# Patient Record
Sex: FEMALE | Race: WHITE | Employment: UNEMPLOYED | URBAN - METROPOLITAN AREA
[De-identification: names, ages, dates, MRNs, and addresses within clinical notes are randomized per-mention and may not be internally consistent; named-entity substitution may affect disease eponyms.]

---

## 2017-11-02 ENCOUNTER — OFFICE VISIT (OUTPATIENT)
Dept: INTERNAL MEDICINE CLINIC | Age: 47
End: 2017-11-02

## 2017-11-02 VITALS
HEART RATE: 80 BPM | BODY MASS INDEX: 25.74 KG/M2 | SYSTOLIC BLOOD PRESSURE: 100 MMHG | DIASTOLIC BLOOD PRESSURE: 70 MMHG | WEIGHT: 164 LBS | OXYGEN SATURATION: 98 % | HEIGHT: 67 IN

## 2017-11-02 DIAGNOSIS — E53.8 VITAMIN B12 DEFICIENCY: ICD-10-CM

## 2017-11-02 DIAGNOSIS — Z13.220 SCREENING, LIPID: ICD-10-CM

## 2017-11-02 DIAGNOSIS — Z12.31 ENCOUNTER FOR SCREENING MAMMOGRAM FOR MALIGNANT NEOPLASM OF BREAST: ICD-10-CM

## 2017-11-02 DIAGNOSIS — E61.1 IRON DEFICIENCY: Primary | ICD-10-CM

## 2017-11-02 DIAGNOSIS — Z13.1 SCREENING FOR DIABETES MELLITUS: ICD-10-CM

## 2017-11-02 DIAGNOSIS — Z98.84 HISTORY OF GASTRIC BYPASS: ICD-10-CM

## 2017-11-02 DIAGNOSIS — T81.30XA DISRUPTION OF PERINEAL WOUND: ICD-10-CM

## 2017-11-02 PROCEDURE — 4004F PT TOBACCO SCREEN RCVD TLK: CPT | Performed by: INTERNAL MEDICINE

## 2017-11-02 PROCEDURE — G8427 DOCREV CUR MEDS BY ELIG CLIN: HCPCS | Performed by: INTERNAL MEDICINE

## 2017-11-02 PROCEDURE — G8419 CALC BMI OUT NRM PARAM NOF/U: HCPCS | Performed by: INTERNAL MEDICINE

## 2017-11-02 PROCEDURE — 99204 OFFICE O/P NEW MOD 45 MIN: CPT | Performed by: INTERNAL MEDICINE

## 2017-11-02 PROCEDURE — G8482 FLU IMMUNIZE ORDER/ADMIN: HCPCS | Performed by: INTERNAL MEDICINE

## 2017-11-02 RX ORDER — LORAZEPAM 2 MG/1
TABLET ORAL
COMMUNITY
Start: 2016-11-19 | End: 2018-09-19 | Stop reason: SDUPTHER

## 2017-11-02 RX ORDER — FUROSEMIDE 40 MG/1
TABLET ORAL
COMMUNITY
Start: 2017-09-07 | End: 2019-04-29 | Stop reason: SDUPTHER

## 2017-11-02 RX ORDER — QUETIAPINE FUMARATE 400 MG/1
800 TABLET, FILM COATED ORAL
COMMUNITY
End: 2018-09-19 | Stop reason: SDUPTHER

## 2017-11-02 NOTE — PROGRESS NOTES
Chief Complaint   Patient presents with    Established New Doctor          History of Present Illness:  Rahel Guerrero is a 41 y.o. female here to establish care. Patient has a history of ulcerative colitis and is s/p colectomy in 2006, obesity s/p Dolly-en-y procedure in 2001 (max weight 350), small intestine surgery in 2003, transanal rectal resection in 2008, depression, anxiety, post- op DVT, rosacea. She is worried about malabsorption because of her many GI surgeries. She has a draining tract in her perineum that will not heal so she is worried some type of vitamin deficiency is contributing. She has an ileostomy. Output is stable. Her weight is up and she is actually trying to lose weight. [email or web address]. She has not seen a dietitian     Her mood is stable. She sees a psychiatrist and is sleeping much better. Patient's medical record reviewed: In May 2017 she was admitted to observation in DKA and RUFUS. Glucose improved with fluids only, no insulin was needed. A1C was normal at 5.4%. She was anemic at that time and had low iron. There does not appear to be PCP follow up for this. She has Vitamin B12 deficiency         History:     Past Medical History:   Diagnosis Date    Depression     [facility]) ([facility] [address]     post operative, left thigh    Ileostomy in place ([facility] [address] [facility]     Ulcerative colitis ([facility] [address] [facility]     Vitamin B12 deficiency        Past Surgical History:   Procedure Laterality Date    BREAST ENHANCEMENT SURGERY  2003    COLECTOMY  2006    COSMETIC SURGERY      medial thigh lift    RECTAL SURGERY  2008    Transanal rectal resection    DOLLY-EN-Y GASTRIC BYPASS  2001       Social History     Social History    Marital status: N/A     Spouse name: N/A    Number of children: N/A    Years of education: N/A     Occupational History    Not on file.      Social History Main Topics    Smoking status: Light Tobacco Smoker     Types: E-Cigarettes     Last attempt to quit: 1/1/2010    Smokeless tobacco: Never Used    Alcohol use 4.2 oz/week     7 Glasses of wine per week    Drug use: No    Sexual activity: Not on file     Other Topics Concern    Not on file     Social History Narrative    No narrative on file       Family History   Problem Relation Age of Onset    Coronary Art Dis Father     Cancer Father      Throat    COPD Father     Arthritis Father     Diabetes Mother     Arthritis Mother     Lupus Mother          Review of Systems:  Review of Systems   Constitutional: Negative for fatigue and fever. HENT: Negative for ear pain, hearing loss, postnasal drip, rhinorrhea, sinus pressure, sore throat and tinnitus. Eyes: Negative for redness. Respiratory: Negative for cough, chest tightness, shortness of breath and wheezing. Cardiovascular: Negative for chest pain, palpitations and leg swelling. Gastrointestinal: Negative for abdominal pain, constipation, diarrhea, nausea and vomiting. Genitourinary: Negative for dysuria and frequency. Musculoskeletal: Negative for arthralgias, back pain and joint swelling. Skin: Positive for wound. Negative for rash. Neurological: Negative for dizziness, syncope and headaches. Objective:    Vitals:    11/02/17 1506   BP: 100/70   Pulse: 80   SpO2: 98%   Weight: 164 lb (74.4 kg)   Height: 5' 6.6\" (1.692 m)     Body mass index is 26 kg/m². Physical Exam   Constitutional: She appears well-developed and well-nourished. She does not have a sickly appearance. HENT:   Head: Atraumatic. Right Ear: Hearing, tympanic membrane, external ear and ear canal normal.   Left Ear: Hearing, tympanic membrane, external ear and ear canal normal.   Nose: Nose normal. No mucosal edema or rhinorrhea. Eyes: Pupils are equal, round, and reactive to light. No scleral icterus. Neck: Trachea normal. No thyroid mass and no thyromegaly present.    Cardiovascular: Normal rate, regular rhythm, S1 normal and S2 normal.    No murmur heard.  Pulmonary/Chest: Effort normal and breath sounds normal. No respiratory distress. She has no wheezes. She has no rhonchi. She has no rales. Abdominal: Soft. Bowel sounds are normal. There is no tenderness. Wearing girdle, unable to visualize ostomy    Lymphadenopathy:     She has no cervical adenopathy. Neurological: She is alert. No cranial nerve deficit. She exhibits normal muscle tone. Gait normal.   Skin: Skin is warm and dry. No rash noted. Assessment and Plan    1. Iron deficiency  Check iron levels and CBC   - CBC Auto Differential; Future  - Iron and TIBC; Future    2. Vitamin B12 deficiency  Check B12 level  - CBC Auto Differential; Future  - Vitamin B12 & Folate; Future  - Comprehensive Metabolic Panel; Future    3. Disruption of perineal wound  Check zinc   - ZINC; Future    4. History of gastric bypass  Offered info for [employer] for dietitian. Her nutritional needs are unique given her history of bariatric surgery, ileostomy, and now possible diabetes. - Vitamin D 25 Hydroxy; Future    5. Screening for diabetes mellitus  Patient admitted for DKA in May but no follow up for this. Check A1C.   - Hemoglobin A1C; Future    6. Encounter for screening mammogram for malignant neoplasm of breast    - DINA DIGITAL SCREEN W CAD BILATERAL; Future    7. Screening, lipid    - Lipid Panel; Future     Patient Instructions   Iron deficiency  Check CBC and iron levels    Vitamin B12 deficiency  Check CBC and B12 levels    Poor wound healing  Check sink level     History of gastric bypass  Check vitamin D    Routine screening  Check for diabetes  Check mammogram          Return in about 3 months (around 2/2/2018) for Vitmamin Deficiency .       Angelo Vigil

## 2017-11-07 ASSESSMENT — ENCOUNTER SYMPTOMS
BACK PAIN: 0
SORE THROAT: 0
RHINORRHEA: 0
COUGH: 0
SHORTNESS OF BREATH: 0
CHEST TIGHTNESS: 0
DIARRHEA: 0
ABDOMINAL PAIN: 0
CONSTIPATION: 0
NAUSEA: 0
VOMITING: 0
EYE REDNESS: 0
SINUS PRESSURE: 0
WHEEZING: 0

## 2017-11-28 ENCOUNTER — HOSPITAL ENCOUNTER (OUTPATIENT)
Dept: MAMMOGRAPHY | Age: 47
Discharge: HOME | End: 2017-11-28
Attending: INTERNAL MEDICINE | Admitting: INTERNAL MEDICINE

## 2017-11-28 DIAGNOSIS — Z12.31 VISIT FOR SCREENING MAMMOGRAM: ICD-10-CM

## 2017-11-28 DIAGNOSIS — Z13.220 SCREENING, LIPID: ICD-10-CM

## 2017-11-28 DIAGNOSIS — T81.30XA DISRUPTION OF PERINEAL WOUND: ICD-10-CM

## 2017-11-28 DIAGNOSIS — E61.1 IRON DEFICIENCY: ICD-10-CM

## 2017-11-28 DIAGNOSIS — Z98.84 HISTORY OF GASTRIC BYPASS: ICD-10-CM

## 2017-11-28 DIAGNOSIS — Z13.1 SCREENING FOR DIABETES MELLITUS: ICD-10-CM

## 2017-11-28 DIAGNOSIS — E53.8 VITAMIN B12 DEFICIENCY: ICD-10-CM

## 2017-11-28 LAB
A/G RATIO: 1.9 (ref 1.1–2.2)
ALBUMIN SERPL-MCNC: 4.7 G/DL (ref 3.4–5)
ALP BLD-CCNC: 71 U/L (ref 40–129)
ALT SERPL-CCNC: 12 U/L (ref 10–40)
ANION GAP SERPL CALCULATED.3IONS-SCNC: 15 MMOL/L (ref 3–16)
AST SERPL-CCNC: 17 U/L (ref 15–37)
BASOPHILS ABSOLUTE: 0.1 K/UL (ref 0–0.2)
BASOPHILS RELATIVE PERCENT: 1.2 %
BILIRUB SERPL-MCNC: <0.2 MG/DL (ref 0–1)
BUN BLDV-MCNC: 18 MG/DL (ref 7–20)
CALCIUM SERPL-MCNC: 9.7 MG/DL (ref 8.3–10.6)
CHLORIDE BLD-SCNC: 101 MMOL/L (ref 99–110)
CHOLESTEROL, TOTAL: 212 MG/DL (ref 0–199)
CO2: 26 MMOL/L (ref 21–32)
CREAT SERPL-MCNC: 0.7 MG/DL (ref 0.6–1.1)
EOSINOPHILS ABSOLUTE: 0.2 K/UL (ref 0–0.6)
EOSINOPHILS RELATIVE PERCENT: 2.6 %
ESTIMATED AVERAGE GLUCOSE: 114 MG/DL
FOLATE: 11.17 NG/ML (ref 4.78–24.2)
GFR AFRICAN AMERICAN: >60
GFR NON-AFRICAN AMERICAN: >60
GLOBULIN: 2.5 G/DL
GLUCOSE BLD-MCNC: 97 MG/DL (ref 70–99)
HBA1C MFR BLD: 5.6 %
HCT VFR BLD CALC: 40.6 % (ref 36–48)
HDLC SERPL-MCNC: 103 MG/DL (ref 40–60)
HEMOGLOBIN: 13.1 G/DL (ref 12–16)
IRON SATURATION: 9 % (ref 15–50)
IRON: 43 UG/DL (ref 37–145)
LDL CHOLESTEROL CALCULATED: 94 MG/DL
LYMPHOCYTES ABSOLUTE: 1.4 K/UL (ref 1–5.1)
LYMPHOCYTES RELATIVE PERCENT: 20.3 %
MCH RBC QN AUTO: 26.9 PG (ref 26–34)
MCHC RBC AUTO-ENTMCNC: 32.3 G/DL (ref 31–36)
MCV RBC AUTO: 83.2 FL (ref 80–100)
MONOCYTES ABSOLUTE: 0.6 K/UL (ref 0–1.3)
MONOCYTES RELATIVE PERCENT: 8.9 %
NEUTROPHILS ABSOLUTE: 4.5 K/UL (ref 1.7–7.7)
NEUTROPHILS RELATIVE PERCENT: 67 %
PDW BLD-RTO: 14.5 % (ref 12.4–15.4)
PLATELET # BLD: 203 K/UL (ref 135–450)
PMV BLD AUTO: 11.1 FL (ref 5–10.5)
POTASSIUM SERPL-SCNC: 4.7 MMOL/L (ref 3.5–5.1)
RBC # BLD: 4.88 M/UL (ref 4–5.2)
SODIUM BLD-SCNC: 142 MMOL/L (ref 136–145)
TOTAL IRON BINDING CAPACITY: 478 UG/DL (ref 260–445)
TOTAL PROTEIN: 7.2 G/DL (ref 6.4–8.2)
TRIGL SERPL-MCNC: 74 MG/DL (ref 0–150)
VITAMIN B-12: 338 PG/ML (ref 211–911)
VITAMIN D 25-HYDROXY: 24.9 NG/ML
VLDLC SERPL CALC-MCNC: 15 MG/DL
WBC # BLD: 6.7 K/UL (ref 4–11)

## 2017-11-29 ENCOUNTER — TELEPHONE (OUTPATIENT)
Dept: INTERNAL MEDICINE CLINIC | Age: 47
End: 2017-11-29

## 2017-11-29 NOTE — TELEPHONE ENCOUNTER
Patient would like Dr. Samuel Gracia to call her ASAP (today) with lab results. She can only see certain results on My Chart. She has several questions.

## 2017-11-30 LAB — ZINC: 69 UG/DL (ref 60–120)

## 2018-04-03 ENCOUNTER — OFFICE VISIT (OUTPATIENT)
Dept: INTERNAL MEDICINE CLINIC | Age: 48
End: 2018-04-03

## 2018-04-03 VITALS
OXYGEN SATURATION: 98 % | SYSTOLIC BLOOD PRESSURE: 102 MMHG | BODY MASS INDEX: 26.07 KG/M2 | WEIGHT: 172 LBS | HEIGHT: 68 IN | DIASTOLIC BLOOD PRESSURE: 70 MMHG | HEART RATE: 78 BPM

## 2018-04-03 DIAGNOSIS — F41.1 ANXIETY STATE: ICD-10-CM

## 2018-04-03 DIAGNOSIS — R63.5 ABNORMAL WEIGHT GAIN: Primary | ICD-10-CM

## 2018-04-03 PROCEDURE — G8419 CALC BMI OUT NRM PARAM NOF/U: HCPCS | Performed by: INTERNAL MEDICINE

## 2018-04-03 PROCEDURE — 4004F PT TOBACCO SCREEN RCVD TLK: CPT | Performed by: INTERNAL MEDICINE

## 2018-04-03 PROCEDURE — G8427 DOCREV CUR MEDS BY ELIG CLIN: HCPCS | Performed by: INTERNAL MEDICINE

## 2018-04-03 PROCEDURE — 99213 OFFICE O/P EST LOW 20 MIN: CPT | Performed by: INTERNAL MEDICINE

## 2018-04-06 ASSESSMENT — ENCOUNTER SYMPTOMS
COUGH: 0
CHEST TIGHTNESS: 0
SHORTNESS OF BREATH: 0
EYE REDNESS: 0
BACK PAIN: 0
NAUSEA: 0
RHINORRHEA: 0
DIARRHEA: 0
VISUAL CHANGE: 0
VOMITING: 0
SINUS PRESSURE: 0
ABDOMINAL PAIN: 0
WHEEZING: 0
CONSTIPATION: 0
SORE THROAT: 0

## 2018-05-07 ENCOUNTER — TELEPHONE (OUTPATIENT)
Dept: INTERNAL MEDICINE CLINIC | Age: 48
End: 2018-05-07

## 2018-05-07 DIAGNOSIS — N30.00 ACUTE CYSTITIS WITHOUT HEMATURIA: Primary | ICD-10-CM

## 2018-05-07 RX ORDER — NITROFURANTOIN 25; 75 MG/1; MG/1
100 CAPSULE ORAL 2 TIMES DAILY
Qty: 14 CAPSULE | Refills: 0 | Status: SHIPPED | OUTPATIENT
Start: 2018-05-07 | End: 2018-05-14

## 2018-05-27 ENCOUNTER — TELEPHONE (OUTPATIENT)
Dept: INTERNAL MEDICINE CLINIC | Age: 48
End: 2018-05-27

## 2018-05-27 ENCOUNTER — E-VISIT (OUTPATIENT)
Dept: INTERNAL MEDICINE CLINIC | Age: 48
End: 2018-05-27

## 2018-05-27 DIAGNOSIS — N39.0 URINARY TRACT INFECTION WITHOUT HEMATURIA, SITE UNSPECIFIED: Primary | ICD-10-CM

## 2018-05-27 PROCEDURE — 99444 PR PHYSICIAN ONLINE EVALUATION & MANAGEMENT SERVICE: CPT | Performed by: FAMILY MEDICINE

## 2018-05-27 RX ORDER — DOXYCYCLINE 100 MG/1
100 CAPSULE ORAL 2 TIMES DAILY
Qty: 14 CAPSULE | Refills: 0 | Status: SHIPPED | OUTPATIENT
Start: 2018-05-27 | End: 2018-07-09 | Stop reason: ALTCHOICE

## 2018-07-09 ENCOUNTER — OFFICE VISIT (OUTPATIENT)
Dept: INTERNAL MEDICINE CLINIC | Age: 48
End: 2018-07-09

## 2018-07-09 VITALS
DIASTOLIC BLOOD PRESSURE: 62 MMHG | HEART RATE: 104 BPM | SYSTOLIC BLOOD PRESSURE: 98 MMHG | OXYGEN SATURATION: 99 % | BODY MASS INDEX: 23.87 KG/M2 | WEIGHT: 157 LBS

## 2018-07-09 DIAGNOSIS — N39.0 RECURRENT UTI: ICD-10-CM

## 2018-07-09 DIAGNOSIS — H00.014 HORDEOLUM EXTERNUM OF LEFT UPPER EYELID: ICD-10-CM

## 2018-07-09 DIAGNOSIS — R63.5 ABNORMAL WEIGHT GAIN: Primary | ICD-10-CM

## 2018-07-09 DIAGNOSIS — N92.6 IRREGULAR MENSES: ICD-10-CM

## 2018-07-09 PROCEDURE — G8427 DOCREV CUR MEDS BY ELIG CLIN: HCPCS | Performed by: INTERNAL MEDICINE

## 2018-07-09 PROCEDURE — 99214 OFFICE O/P EST MOD 30 MIN: CPT | Performed by: INTERNAL MEDICINE

## 2018-07-09 PROCEDURE — 4004F PT TOBACCO SCREEN RCVD TLK: CPT | Performed by: INTERNAL MEDICINE

## 2018-07-09 PROCEDURE — G8420 CALC BMI NORM PARAMETERS: HCPCS | Performed by: INTERNAL MEDICINE

## 2018-07-09 RX ORDER — NITROFURANTOIN 25; 75 MG/1; MG/1
100 CAPSULE ORAL 2 TIMES DAILY
Qty: 10 CAPSULE | Refills: 2 | Status: SHIPPED | OUTPATIENT
Start: 2018-07-09 | End: 2018-07-14

## 2018-07-09 NOTE — PATIENT INSTRUCTIONS
Try Premier Protein Shake- caramel flavor- for creamer     Stye  Swelling will go down with time  If you develop a hard nodule, you may need to see an eye doctor    Recurrent UTI  Take Macrobid as needed     Healthy Weight  Continue Contrave    Irregular Period  Monitor for now     Insomnia  Let me know when you need a refill three days in advance

## 2018-07-10 ASSESSMENT — ENCOUNTER SYMPTOMS
RESPIRATORY NEGATIVE: 1
GASTROINTESTINAL NEGATIVE: 1
ALLERGIC/IMMUNOLOGIC NEGATIVE: 1

## 2018-08-23 DIAGNOSIS — R63.5 ABNORMAL WEIGHT GAIN: ICD-10-CM

## 2018-08-23 RX ORDER — NALTREXONE HYDROCHLORIDE AND BUPROPION HYDROCHLORIDE 8; 90 MG/1; MG/1
TABLET, EXTENDED RELEASE ORAL
Qty: 120 TABLET | Refills: 3 | Status: SHIPPED | OUTPATIENT
Start: 2018-08-23 | End: 2018-11-09 | Stop reason: SDUPTHER

## 2018-09-19 ENCOUNTER — PATIENT MESSAGE (OUTPATIENT)
Dept: INTERNAL MEDICINE CLINIC | Age: 48
End: 2018-09-19

## 2018-09-19 DIAGNOSIS — F41.1 ANXIETY STATE: Primary | ICD-10-CM

## 2018-09-20 RX ORDER — QUETIAPINE FUMARATE 400 MG/1
800 TABLET, FILM COATED ORAL NIGHTLY
Qty: 60 TABLET | Refills: 2 | Status: SHIPPED | OUTPATIENT
Start: 2018-09-20 | End: 2018-11-09 | Stop reason: SDUPTHER

## 2018-09-20 RX ORDER — LORAZEPAM 2 MG/1
TABLET ORAL
Qty: 60 TABLET | Refills: 2 | Status: SHIPPED | OUTPATIENT
Start: 2018-09-20 | End: 2018-11-09 | Stop reason: SDUPTHER

## 2018-11-09 ENCOUNTER — OFFICE VISIT (OUTPATIENT)
Dept: INTERNAL MEDICINE CLINIC | Age: 48
End: 2018-11-09
Payer: COMMERCIAL

## 2018-11-09 VITALS
BODY MASS INDEX: 23.42 KG/M2 | SYSTOLIC BLOOD PRESSURE: 102 MMHG | OXYGEN SATURATION: 98 % | HEART RATE: 92 BPM | WEIGHT: 154 LBS | DIASTOLIC BLOOD PRESSURE: 68 MMHG

## 2018-11-09 DIAGNOSIS — R63.5 ABNORMAL WEIGHT GAIN: ICD-10-CM

## 2018-11-09 DIAGNOSIS — K51.919 ULCERATIVE COLITIS WITH COMPLICATION, UNSPECIFIED LOCATION (HCC): Primary | ICD-10-CM

## 2018-11-09 DIAGNOSIS — F41.1 ANXIETY STATE: ICD-10-CM

## 2018-11-09 DIAGNOSIS — R61 NIGHT SWEATS: ICD-10-CM

## 2018-11-09 LAB
A/G RATIO: 2 (ref 1.1–2.2)
ALBUMIN SERPL-MCNC: 4.9 G/DL (ref 3.4–5)
ALP BLD-CCNC: 87 U/L (ref 40–129)
ALT SERPL-CCNC: 15 U/L (ref 10–40)
ANION GAP SERPL CALCULATED.3IONS-SCNC: 17 MMOL/L (ref 3–16)
AST SERPL-CCNC: 22 U/L (ref 15–37)
BASOPHILS ABSOLUTE: 0.1 K/UL (ref 0–0.2)
BASOPHILS RELATIVE PERCENT: 0.8 %
BILIRUB SERPL-MCNC: <0.2 MG/DL (ref 0–1)
BUN BLDV-MCNC: 12 MG/DL (ref 7–20)
CALCIUM SERPL-MCNC: 9.1 MG/DL (ref 8.3–10.6)
CHLORIDE BLD-SCNC: 99 MMOL/L (ref 99–110)
CO2: 23 MMOL/L (ref 21–32)
CREAT SERPL-MCNC: 0.8 MG/DL (ref 0.6–1.1)
EOSINOPHILS ABSOLUTE: 0.1 K/UL (ref 0–0.6)
EOSINOPHILS RELATIVE PERCENT: 0.8 %
FOLLICLE STIMULATING HORMONE: 6.2 MIU/ML
GFR AFRICAN AMERICAN: >60
GFR NON-AFRICAN AMERICAN: >60
GLOBULIN: 2.5 G/DL
GLUCOSE BLD-MCNC: 95 MG/DL (ref 70–99)
HCT VFR BLD CALC: 37.6 % (ref 36–48)
HEMOGLOBIN: 11.8 G/DL (ref 12–16)
LYMPHOCYTES ABSOLUTE: 1.6 K/UL (ref 1–5.1)
LYMPHOCYTES RELATIVE PERCENT: 16.5 %
MCH RBC QN AUTO: 23.8 PG (ref 26–34)
MCHC RBC AUTO-ENTMCNC: 31.5 G/DL (ref 31–36)
MCV RBC AUTO: 75.5 FL (ref 80–100)
MONOCYTES ABSOLUTE: 0.8 K/UL (ref 0–1.3)
MONOCYTES RELATIVE PERCENT: 8.3 %
NEUTROPHILS ABSOLUTE: 7.3 K/UL (ref 1.7–7.7)
NEUTROPHILS RELATIVE PERCENT: 73.6 %
PDW BLD-RTO: 16.7 % (ref 12.4–15.4)
PLATELET # BLD: 258 K/UL (ref 135–450)
PMV BLD AUTO: 10.9 FL (ref 5–10.5)
POTASSIUM SERPL-SCNC: 3.5 MMOL/L (ref 3.5–5.1)
RBC # BLD: 4.97 M/UL (ref 4–5.2)
SODIUM BLD-SCNC: 139 MMOL/L (ref 136–145)
TOTAL PROTEIN: 7.4 G/DL (ref 6.4–8.2)
TSH SERPL DL<=0.05 MIU/L-ACNC: 1 UIU/ML (ref 0.27–4.2)
WBC # BLD: 9.9 K/UL (ref 4–11)

## 2018-11-09 PROCEDURE — G8427 DOCREV CUR MEDS BY ELIG CLIN: HCPCS | Performed by: INTERNAL MEDICINE

## 2018-11-09 PROCEDURE — G8420 CALC BMI NORM PARAMETERS: HCPCS | Performed by: INTERNAL MEDICINE

## 2018-11-09 PROCEDURE — 99214 OFFICE O/P EST MOD 30 MIN: CPT | Performed by: INTERNAL MEDICINE

## 2018-11-09 PROCEDURE — G8484 FLU IMMUNIZE NO ADMIN: HCPCS | Performed by: INTERNAL MEDICINE

## 2018-11-09 PROCEDURE — 4004F PT TOBACCO SCREEN RCVD TLK: CPT | Performed by: INTERNAL MEDICINE

## 2018-11-09 RX ORDER — QUETIAPINE FUMARATE 400 MG/1
800 TABLET, FILM COATED ORAL NIGHTLY
Qty: 60 TABLET | Refills: 2 | Status: SHIPPED | OUTPATIENT
Start: 2018-11-09 | End: 2019-04-03 | Stop reason: SDUPTHER

## 2018-11-09 RX ORDER — BACITRACIN 500 [USP'U]/G
OINTMENT OPHTHALMIC
Refills: 5 | COMMUNITY
Start: 2018-09-14 | End: 2019-02-11 | Stop reason: ALTCHOICE

## 2018-11-09 RX ORDER — DOXYCYCLINE HYCLATE 100 MG/1
CAPSULE ORAL
Refills: 5 | COMMUNITY
Start: 2018-10-09 | End: 2019-07-03 | Stop reason: ALTCHOICE

## 2018-11-09 RX ORDER — LORAZEPAM 2 MG/1
TABLET ORAL
Qty: 60 TABLET | Refills: 2 | Status: SHIPPED | OUTPATIENT
Start: 2018-11-09 | End: 2018-12-18 | Stop reason: SDUPTHER

## 2018-11-09 RX ORDER — NYSTATIN 100000 [USP'U]/G
POWDER TOPICAL
Refills: 3 | COMMUNITY
Start: 2018-09-24

## 2018-11-09 ASSESSMENT — ENCOUNTER SYMPTOMS
RHINORRHEA: 0
DIARRHEA: 0
CHEST TIGHTNESS: 0
BACK PAIN: 0
ABDOMINAL PAIN: 0
SHORTNESS OF BREATH: 0
NAUSEA: 0
CONSTIPATION: 0
WHEEZING: 0
SORE THROAT: 0
EYE REDNESS: 0
SINUS PRESSURE: 0
VOMITING: 0
COUGH: 0

## 2018-11-09 ASSESSMENT — PATIENT HEALTH QUESTIONNAIRE - PHQ9
1. LITTLE INTEREST OR PLEASURE IN DOING THINGS: 0
SUM OF ALL RESPONSES TO PHQ QUESTIONS 1-9: 0
SUM OF ALL RESPONSES TO PHQ QUESTIONS 1-9: 0
2. FEELING DOWN, DEPRESSED OR HOPELESS: 0
SUM OF ALL RESPONSES TO PHQ9 QUESTIONS 1 & 2: 0

## 2018-11-09 NOTE — PROGRESS NOTES
[address] [facility] Internal Medicine- Pediatrics      Patient Name: Sung Reyes    YOB: 1970    Today's Date: 11/9/18           Chief Complaint   Patient presents with    3 Month Follow-Up    Insomnia    Other     healthy weight          Subjective:  Healthy Weight  Taking Contrave  Uses Weight Watchers   She monitors her calorie intake  Eats more fruit  Vegetables alter her ostomy output  Goal weight is 142    Exercising  Working out at gym and walking  She has cut back recently due to a challenging case     Mood  Sleep is good  Work has been stressful            History:     Past Medical History:   Diagnosis Date    Depression     DVT (deep venous thrombosis) ([facility] [address]     post operative, left thigh    Ileostomy in place ([facility] [address]     Ulcerative colitis ([facility] [address]     Vitamin B12 deficiency        Current Outpatient Prescriptions on File Prior to Visit   Medication Sig Dispense Refill    furosemide (LASIX) 40 MG tablet TAKE 1 TABLET BY MOUTH EVERY DAY       No current facility-administered medications on file prior to visit. Review of Systems:    Review of Systems   Constitutional: Negative for fatigue and fever. HENT: Negative for ear pain, hearing loss, postnasal drip, rhinorrhea, sinus pressure, sore throat and tinnitus. Eyes: Negative for redness. Respiratory: Negative for cough, chest tightness, shortness of breath and wheezing. Cardiovascular: Negative for chest pain, palpitations and leg swelling. Gastrointestinal: Negative for abdominal pain, constipation, diarrhea, nausea and vomiting. Genitourinary: Positive for menstrual problem (irregular ). Negative for dysuria and frequency. Musculoskeletal: Negative for arthralgias, back pain and joint swelling. Skin: Negative for rash. Neurological: Negative for dizziness, syncope and headaches.        Objective:    Vitals:    11/09/18 1500   BP: 102/68   Pulse: 92   SpO2: 98%   Weight: 154 lb (69.9 kg)     Wt Readings

## 2018-12-17 ENCOUNTER — TELEPHONE (OUTPATIENT)
Dept: INTERNAL MEDICINE CLINIC | Age: 48
End: 2018-12-17

## 2018-12-18 ENCOUNTER — PATIENT MESSAGE (OUTPATIENT)
Dept: INTERNAL MEDICINE CLINIC | Age: 48
End: 2018-12-18

## 2018-12-18 DIAGNOSIS — F41.1 ANXIETY STATE: ICD-10-CM

## 2018-12-19 RX ORDER — LORAZEPAM 2 MG/1
TABLET ORAL
Qty: 60 TABLET | Refills: 2 | Status: SHIPPED | OUTPATIENT
Start: 2018-12-19 | End: 2019-02-11 | Stop reason: SDUPTHER

## 2019-02-11 ENCOUNTER — OFFICE VISIT (OUTPATIENT)
Dept: INTERNAL MEDICINE CLINIC | Age: 49
End: 2019-02-11
Payer: COMMERCIAL

## 2019-02-11 VITALS
WEIGHT: 159 LBS | OXYGEN SATURATION: 97 % | HEART RATE: 78 BPM | BODY MASS INDEX: 24.18 KG/M2 | SYSTOLIC BLOOD PRESSURE: 122 MMHG | DIASTOLIC BLOOD PRESSURE: 68 MMHG

## 2019-02-11 DIAGNOSIS — E61.1 IRON DEFICIENCY: ICD-10-CM

## 2019-02-11 DIAGNOSIS — R63.5 ABNORMAL WEIGHT GAIN: Primary | ICD-10-CM

## 2019-02-11 DIAGNOSIS — F41.1 ANXIETY STATE: ICD-10-CM

## 2019-02-11 PROCEDURE — G8427 DOCREV CUR MEDS BY ELIG CLIN: HCPCS | Performed by: INTERNAL MEDICINE

## 2019-02-11 PROCEDURE — 99214 OFFICE O/P EST MOD 30 MIN: CPT | Performed by: INTERNAL MEDICINE

## 2019-02-11 PROCEDURE — 4004F PT TOBACCO SCREEN RCVD TLK: CPT | Performed by: INTERNAL MEDICINE

## 2019-02-11 PROCEDURE — G8484 FLU IMMUNIZE NO ADMIN: HCPCS | Performed by: INTERNAL MEDICINE

## 2019-02-11 PROCEDURE — G8420 CALC BMI NORM PARAMETERS: HCPCS | Performed by: INTERNAL MEDICINE

## 2019-02-11 RX ORDER — LORAZEPAM 2 MG/1
TABLET ORAL
Qty: 60 TABLET | Refills: 2 | Status: SHIPPED | OUTPATIENT
Start: 2019-02-11 | End: 2019-06-14 | Stop reason: SDUPTHER

## 2019-02-11 ASSESSMENT — PATIENT HEALTH QUESTIONNAIRE - PHQ9
1. LITTLE INTEREST OR PLEASURE IN DOING THINGS: 0
SUM OF ALL RESPONSES TO PHQ QUESTIONS 1-9: 0
SUM OF ALL RESPONSES TO PHQ9 QUESTIONS 1 & 2: 0
2. FEELING DOWN, DEPRESSED OR HOPELESS: 0
SUM OF ALL RESPONSES TO PHQ QUESTIONS 1-9: 0

## 2019-02-11 ASSESSMENT — ENCOUNTER SYMPTOMS
COUGH: 0
SORE THROAT: 0
CHEST TIGHTNESS: 0
EYE REDNESS: 0
CONSTIPATION: 0
BACK PAIN: 0
VOMITING: 0
SHORTNESS OF BREATH: 0
DIARRHEA: 0
SINUS PRESSURE: 0
NAUSEA: 0
ABDOMINAL PAIN: 0
WHEEZING: 0
RHINORRHEA: 0

## 2019-04-03 DIAGNOSIS — F41.1 ANXIETY STATE: ICD-10-CM

## 2019-04-04 DIAGNOSIS — F41.1 ANXIETY STATE: ICD-10-CM

## 2019-04-04 RX ORDER — QUETIAPINE FUMARATE 400 MG/1
800 TABLET, FILM COATED ORAL NIGHTLY
Qty: 60 TABLET | Refills: 2 | Status: CANCELLED | OUTPATIENT
Start: 2019-04-04

## 2019-04-05 RX ORDER — QUETIAPINE FUMARATE 400 MG/1
TABLET, FILM COATED ORAL
Qty: 60 TABLET | Refills: 2 | Status: SHIPPED | OUTPATIENT
Start: 2019-04-05 | End: 2019-04-28 | Stop reason: SDUPTHER

## 2019-04-05 NOTE — TELEPHONE ENCOUNTER
Patient returning call inquiring about Seroquel refill    Patient states she is out of medication    Please advise

## 2019-04-28 DIAGNOSIS — F41.1 ANXIETY STATE: ICD-10-CM

## 2019-05-01 RX ORDER — FUROSEMIDE 40 MG/1
TABLET ORAL
Qty: 60 TABLET | Refills: 2 | Status: SHIPPED | OUTPATIENT
Start: 2019-05-01 | End: 2019-06-12 | Stop reason: SDUPTHER

## 2019-05-01 RX ORDER — QUETIAPINE FUMARATE 400 MG/1
TABLET, FILM COATED ORAL
Qty: 60 TABLET | Refills: 2 | Status: SHIPPED | OUTPATIENT
Start: 2019-05-01 | End: 2019-07-29 | Stop reason: SDUPTHER

## 2019-05-23 RX ORDER — MAGNESIUM 200 MG
TABLET ORAL
Qty: 30 TABLET | Refills: 4 | Status: SHIPPED | OUTPATIENT
Start: 2019-05-23 | End: 2020-04-03

## 2019-06-10 DIAGNOSIS — R63.5 ABNORMAL WEIGHT GAIN: ICD-10-CM

## 2019-06-12 RX ORDER — FUROSEMIDE 40 MG/1
TABLET ORAL
Qty: 60 TABLET | Refills: 2 | Status: SHIPPED | OUTPATIENT
Start: 2019-06-12 | End: 2019-10-03 | Stop reason: SDUPTHER

## 2019-06-14 DIAGNOSIS — F41.1 ANXIETY STATE: ICD-10-CM

## 2019-06-14 NOTE — TELEPHONE ENCOUNTER
Patient returning call stating that she is waiting for her RX for Lorazepam    Recent message requested for Lorazepam instead Lasix was sent in    Patient states she has appointment on 7/3/19 and needs some medication until appointment    Patient also requesting a phone call from physician    Please advise

## 2019-06-17 RX ORDER — LORAZEPAM 2 MG/1
TABLET ORAL
Qty: 60 TABLET | Refills: 2 | Status: SHIPPED | OUTPATIENT
Start: 2019-06-17 | End: 2019-08-01 | Stop reason: SDUPTHER

## 2019-06-17 NOTE — TELEPHONE ENCOUNTER
Patient calling office again in regards to medication refill need stating that she will be going out of town and needs the medication filled before 6/19.

## 2019-07-03 ENCOUNTER — OFFICE VISIT (OUTPATIENT)
Dept: INTERNAL MEDICINE CLINIC | Age: 49
End: 2019-07-03
Payer: COMMERCIAL

## 2019-07-03 VITALS
SYSTOLIC BLOOD PRESSURE: 104 MMHG | BODY MASS INDEX: 23.95 KG/M2 | WEIGHT: 158 LBS | DIASTOLIC BLOOD PRESSURE: 76 MMHG | HEIGHT: 68 IN | OXYGEN SATURATION: 98 % | HEART RATE: 89 BPM

## 2019-07-03 DIAGNOSIS — Z01.419 WELL WOMAN EXAM: Primary | ICD-10-CM

## 2019-07-03 DIAGNOSIS — Z11.3 ROUTINE SCREENING FOR STI (SEXUALLY TRANSMITTED INFECTION): ICD-10-CM

## 2019-07-03 DIAGNOSIS — E61.1 IRON DEFICIENCY: ICD-10-CM

## 2019-07-03 DIAGNOSIS — Z13.220 SCREENING, LIPID: ICD-10-CM

## 2019-07-03 DIAGNOSIS — N39.0 RECURRENT UTI: ICD-10-CM

## 2019-07-03 LAB
BASOPHILS ABSOLUTE: 0.1 K/UL (ref 0–0.2)
BASOPHILS RELATIVE PERCENT: 0.9 %
BILIRUBIN URINE: NEGATIVE
BLOOD, URINE: NEGATIVE
CHOLESTEROL, TOTAL: 206 MG/DL (ref 0–199)
CLARITY: CLEAR
COLOR: YELLOW
EOSINOPHILS ABSOLUTE: 0.2 K/UL (ref 0–0.6)
EOSINOPHILS RELATIVE PERCENT: 1.7 %
EPITHELIAL CELLS, UA: 1 /HPF (ref 0–5)
GLUCOSE URINE: NEGATIVE MG/DL
HCT VFR BLD CALC: 44.5 % (ref 36–48)
HDLC SERPL-MCNC: 116 MG/DL (ref 40–60)
HEMOGLOBIN: 14.5 G/DL (ref 12–16)
HYALINE CASTS: 0 /LPF (ref 0–8)
IRON SATURATION: 16 % (ref 15–50)
IRON: 62 UG/DL (ref 37–145)
KETONES, URINE: NEGATIVE MG/DL
LDL CHOLESTEROL CALCULATED: 76 MG/DL
LEUKOCYTE ESTERASE, URINE: ABNORMAL
LYMPHOCYTES ABSOLUTE: 1.8 K/UL (ref 1–5.1)
LYMPHOCYTES RELATIVE PERCENT: 19.6 %
MCH RBC QN AUTO: 29.9 PG (ref 26–34)
MCHC RBC AUTO-ENTMCNC: 32.5 G/DL (ref 31–36)
MCV RBC AUTO: 92 FL (ref 80–100)
MICROSCOPIC EXAMINATION: YES
MONOCYTES ABSOLUTE: 0.8 K/UL (ref 0–1.3)
MONOCYTES RELATIVE PERCENT: 9.1 %
NEUTROPHILS ABSOLUTE: 6.3 K/UL (ref 1.7–7.7)
NEUTROPHILS RELATIVE PERCENT: 68.7 %
NITRITE, URINE: NEGATIVE
PDW BLD-RTO: 14.7 % (ref 12.4–15.4)
PH UA: 6.5 (ref 5–8)
PLATELET # BLD: 214 K/UL (ref 135–450)
PMV BLD AUTO: 11.2 FL (ref 5–10.5)
PROTEIN UA: NEGATIVE MG/DL
RBC # BLD: 4.84 M/UL (ref 4–5.2)
RBC UA: 1 /HPF (ref 0–4)
SPECIFIC GRAVITY UA: 1.01 (ref 1–1.03)
TOTAL IRON BINDING CAPACITY: 396 UG/DL (ref 260–445)
TRIGL SERPL-MCNC: 72 MG/DL (ref 0–150)
URINE TYPE: ABNORMAL
UROBILINOGEN, URINE: 0.2 E.U./DL
VLDLC SERPL CALC-MCNC: 14 MG/DL
WBC # BLD: 9.2 K/UL (ref 4–11)
WBC UA: 1 /HPF (ref 0–5)

## 2019-07-03 PROCEDURE — 99396 PREV VISIT EST AGE 40-64: CPT | Performed by: INTERNAL MEDICINE

## 2019-07-03 RX ORDER — POLYMYXIN B SULFATE AND TRIMETHOPRIM 1; 10000 MG/ML; [USP'U]/ML
1 SOLUTION OPHTHALMIC EVERY 6 HOURS
Qty: 1 BOTTLE | Refills: 0 | Status: SHIPPED | OUTPATIENT
Start: 2019-07-03 | End: 2019-07-13

## 2019-07-03 NOTE — PROGRESS NOTES
History and Physical      Beulah Montague  YOB: 1970    Date of Service:  7/3/2019    Chief Complaint:   Beulah Montague is a 57 y.o. female who presents for complete physical examination. HPI:   Left eye- felt like something is in it. She is worried she scratchd her cornea     Health weight  Still feels Contrave is working. She feels she hit a wall. Wt Readings from Last 3 Encounters:   07/03/19 158 lb (71.7 kg)   02/11/19 159 lb (72.1 kg)   11/09/18 154 lb (69.9 kg)     BP Readings from Last 3 Encounters:   07/03/19 104/76   02/11/19 122/68   11/09/18 102/68       Patient Active Problem List   Diagnosis    Anxiety state    Ile[facility] in place [facility])    Intestinal malabsorption    Ulcerative colitis ([facility] [address]       Preventive Care:  Health Maintenance   Topic Date Due    Pneumococcal 0-64 years Vaccine (1 of 1 - PPSV23) 09/30/1978    HIV screen  09/30/1987    Flu vaccine (1) 09/01/2019    Potassium monitoring  11/09/2019    Creatinine monitoring  11/09/2019    Lipid screen  07/03/2024    Cervical cancer screen  07/03/2024    DTaP/Tdap/Td vaccine (2 - Td) 09/12/2026      Hx abnormal PAP: yes - years ago   Sexual activity: single partner, contraception - none   Self-breast exams: no  Previous DEXA scan: no  Exercise: intermittently   Seatbelt use: yes  Diet  Breakfast- coffee with sugar free creamer, diet yogurt, banana  Lunch- [facility] [employer]- two glasses of wine (sometimes more), steak cardoso salad  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    Lipid panel:   Lab Results   Component Value Date    CHOL 206 (H) 07/03/2019    TRIG 72 07/03/2019     (H) 07/03/2019    LDLCALC 76 07/03/2019        Advance Directive: N, Not Received    Immunization History   Administered Date(s) Administered    Influenza Vaccine, unspecified formulation 09/12/2016    Influenza Virus Vaccine 09/12/2016, 10/03/2017, 10/09/2018    Tdap (Boostrix, Adacel) 09/12/2016 and ear canal normal.   Nose: Nose normal.   Mouth/Throat: Oropharynx is clear and moist, and mucous membranes are normal.  There is no cervical adenopathy. Eyes: Conjunctivae and extraocular motions are normal. Pupils are equal, round, and reactive to light. Neck: Neck supple. No JVD present. Carotid bruit is not present. No mass and no thyromegaly present. Cardiovascular: Normal rate, regular rhythm, normal heart sounds and intact distal pulses. Exam reveals no gallop and no friction rub. No murmur heard. Pulmonary/Chest: Effort normal and breath sounds normal. No respiratory distress. She has no wheezes, rhonchi or rales. Abdominal: Soft, non-tender. Bowel sounds and aorta are normal. She exhibits no organomegaly, mass or bruit. Genitourinary: normal external genitalia, vulva, vagina, uterus and adnexa. Cervix with atypical appearance; on bimanual exam there is a firm ring like structure (from scar tissue previous surgeries?)  Breast exam:  breasts appear normal, no suspicious masses, no skin or nipple changes or axillary nodes. Musculoskeletal: Normal range of motion, no synovitis. She exhibits no edema. Neurological: She is alert and oriented to person, place, and time. She has normal reflexes. No cranial nerve deficit. Coordination normal.   Skin: Skin is warm and dry. There is no rash or erythema. No suspicious lesions noted. Psychiatric: She has a normal mood and affect. Her speech is normal and behavior is normal. Judgment, cognition and memory are normal.     Assessment/Plan:    Nadiya Wiggins was seen today for gynecologic exam.    Diagnoses and all orders for this visit:    Well woman exam  -     PAP SMEAR  -     Lipid Panel    Routine screening for STI (sexually transmitted infection)  -     Cancel: Vaginal Pathogens Probes *A  -     Cancel: C.trachomatis N.gonorrhoeae DNA, Urine;  Future  -     C.trachomatis N.gonorrhoeae DNA  -     Vaginal Pathogens Probes *A    Iron deficiency  -     CBC

## 2019-07-04 LAB
CANDIDA SPECIES, DNA PROBE: NORMAL
GARDNERELLA VAGINALIS, DNA PROBE: NORMAL
TRICHOMONAS VAGINALIS DNA: NORMAL

## 2019-07-09 LAB
C TRACH DNA GENITAL QL NAA+PROBE: NEGATIVE
HPV COMMENT: NORMAL
HPV TYPE 16: NOT DETECTED
HPV TYPE 18: NOT DETECTED
HPVOH (OTHER TYPES): NOT DETECTED
N. GONORRHOEAE DNA: NEGATIVE

## 2019-07-18 ENCOUNTER — TELEPHONE (OUTPATIENT)
Dept: INTERNAL MEDICINE CLINIC | Age: 49
End: 2019-07-18

## 2019-07-18 DIAGNOSIS — N30.00 ACUTE CYSTITIS WITHOUT HEMATURIA: Primary | ICD-10-CM

## 2019-07-18 RX ORDER — PHENAZOPYRIDINE HYDROCHLORIDE 200 MG/1
200 TABLET, FILM COATED ORAL 3 TIMES DAILY PRN
Qty: 10 TABLET | Refills: 0 | Status: SHIPPED | OUTPATIENT
Start: 2019-07-18 | End: 2019-07-21

## 2019-07-18 RX ORDER — NITROFURANTOIN 25; 75 MG/1; MG/1
100 CAPSULE ORAL 2 TIMES DAILY
Qty: 10 CAPSULE | Refills: 0 | Status: SHIPPED | OUTPATIENT
Start: 2019-07-18 | End: 2019-09-12 | Stop reason: SDUPTHER

## 2019-07-18 NOTE — TELEPHONE ENCOUNTER
Script for macrobid and pyridium sent to pharmacy. Please notify patient. If no improvement, schedule appointment.    Claudia Barth MD

## 2019-07-18 NOTE — TELEPHONE ENCOUNTER
Patient states she woke up with UTI symptoms ( pain and frequency)    Patient states she gets UTI frequently and took 2 doses of doxycycline with no relief    Requesting UTI medication and Peridium for pain?     Please advise

## 2019-07-29 DIAGNOSIS — F41.1 ANXIETY STATE: ICD-10-CM

## 2019-07-30 RX ORDER — QUETIAPINE FUMARATE 400 MG/1
TABLET, FILM COATED ORAL
Qty: 180 TABLET | Refills: 0 | Status: SHIPPED | OUTPATIENT
Start: 2019-07-30 | End: 2019-10-03 | Stop reason: SDUPTHER

## 2019-08-01 ENCOUNTER — PATIENT MESSAGE (OUTPATIENT)
Dept: INTERNAL MEDICINE CLINIC | Age: 49
End: 2019-08-01

## 2019-08-01 DIAGNOSIS — F41.1 ANXIETY STATE: ICD-10-CM

## 2019-08-01 RX ORDER — LORAZEPAM 2 MG/1
TABLET ORAL
Qty: 60 TABLET | Refills: 2 | Status: SHIPPED | OUTPATIENT
Start: 2019-08-14 | End: 2019-10-03 | Stop reason: SDUPTHER

## 2019-08-13 ENCOUNTER — TELEPHONE (OUTPATIENT)
Dept: INTERNAL MEDICINE CLINIC | Age: 49
End: 2019-08-13

## 2019-08-13 NOTE — TELEPHONE ENCOUNTER
Patient called as well up set. Pharmacy is requesting a verbal from our office for early fill. Patient is asking if pharmacy can be contacted ASA. Once done patient is asking for a call back to let her know. [phone].

## 2019-09-12 ENCOUNTER — TELEPHONE (OUTPATIENT)
Dept: INTERNAL MEDICINE CLINIC | Age: 49
End: 2019-09-12

## 2019-09-12 DIAGNOSIS — N30.00 ACUTE CYSTITIS WITHOUT HEMATURIA: ICD-10-CM

## 2019-09-12 RX ORDER — NITROFURANTOIN 25; 75 MG/1; MG/1
100 CAPSULE ORAL 2 TIMES DAILY
Qty: 10 CAPSULE | Refills: 0 | Status: SHIPPED | OUTPATIENT
Start: 2019-09-12 | End: 2019-09-17

## 2019-10-03 ENCOUNTER — OFFICE VISIT (OUTPATIENT)
Dept: INTERNAL MEDICINE CLINIC | Age: 49
End: 2019-10-03
Payer: COMMERCIAL

## 2019-10-03 VITALS
SYSTOLIC BLOOD PRESSURE: 98 MMHG | DIASTOLIC BLOOD PRESSURE: 60 MMHG | HEART RATE: 63 BPM | WEIGHT: 159 LBS | OXYGEN SATURATION: 98 % | BODY MASS INDEX: 24.18 KG/M2

## 2019-10-03 DIAGNOSIS — F41.1 ANXIETY STATE: ICD-10-CM

## 2019-10-03 DIAGNOSIS — R63.5 ABNORMAL WEIGHT GAIN: ICD-10-CM

## 2019-10-03 PROCEDURE — 4004F PT TOBACCO SCREEN RCVD TLK: CPT | Performed by: INTERNAL MEDICINE

## 2019-10-03 PROCEDURE — G8482 FLU IMMUNIZE ORDER/ADMIN: HCPCS | Performed by: INTERNAL MEDICINE

## 2019-10-03 PROCEDURE — G8427 DOCREV CUR MEDS BY ELIG CLIN: HCPCS | Performed by: INTERNAL MEDICINE

## 2019-10-03 PROCEDURE — G8420 CALC BMI NORM PARAMETERS: HCPCS | Performed by: INTERNAL MEDICINE

## 2019-10-03 PROCEDURE — 99213 OFFICE O/P EST LOW 20 MIN: CPT | Performed by: INTERNAL MEDICINE

## 2019-10-03 RX ORDER — FUROSEMIDE 40 MG/1
40 TABLET ORAL DAILY PRN
Qty: 90 TABLET | Refills: 5 | Status: SHIPPED | OUTPATIENT
Start: 2019-10-03 | End: 2021-03-24

## 2019-10-03 RX ORDER — CYCLOBENZAPRINE HCL 10 MG
10 TABLET ORAL
COMMUNITY
Start: 2019-10-02 | End: 2022-05-02 | Stop reason: ALTCHOICE

## 2019-10-03 RX ORDER — CELECOXIB 200 MG/1
200 CAPSULE ORAL PRN
COMMUNITY
Start: 2019-10-02 | End: 2022-09-19 | Stop reason: ALTCHOICE

## 2019-10-03 RX ORDER — LORAZEPAM 2 MG/1
TABLET ORAL
Qty: 60 TABLET | Refills: 5 | Status: SHIPPED | OUTPATIENT
Start: 2019-10-03 | End: 2020-04-03 | Stop reason: SDUPTHER

## 2019-10-03 RX ORDER — QUETIAPINE FUMARATE 400 MG/1
TABLET, FILM COATED ORAL
Qty: 180 TABLET | Refills: 1 | Status: SHIPPED | OUTPATIENT
Start: 2019-10-03 | End: 2020-04-03 | Stop reason: SDUPTHER

## 2019-11-18 ENCOUNTER — TELEPHONE (OUTPATIENT)
Dept: INTERNAL MEDICINE CLINIC | Age: 49
End: 2019-11-18

## 2019-11-18 RX ORDER — CIPROFLOXACIN 250 MG/1
250 TABLET, FILM COATED ORAL 2 TIMES DAILY
Qty: 6 TABLET | Refills: 3 | Status: SHIPPED | OUTPATIENT
Start: 2019-11-18 | End: 2019-11-21

## 2019-11-18 RX ORDER — CIPROFLOXACIN 250 MG/1
250 TABLET, FILM COATED ORAL 2 TIMES DAILY
Qty: 6 TABLET | Refills: 0 | Status: SHIPPED | OUTPATIENT
Start: 2019-11-18 | End: 2019-11-18 | Stop reason: SDUPTHER

## 2019-12-05 DIAGNOSIS — F41.1 ANXIETY STATE: ICD-10-CM

## 2019-12-05 RX ORDER — LORAZEPAM 2 MG/1
TABLET ORAL
Qty: 60 TABLET | Refills: 5 | Status: CANCELLED | OUTPATIENT
Start: 2019-12-05 | End: 2020-01-24

## 2020-04-03 ENCOUNTER — VIRTUAL VISIT (OUTPATIENT)
Dept: INTERNAL MEDICINE CLINIC | Age: 50
End: 2020-04-03
Payer: COMMERCIAL

## 2020-04-03 PROCEDURE — G8420 CALC BMI NORM PARAMETERS: HCPCS | Performed by: INTERNAL MEDICINE

## 2020-04-03 PROCEDURE — 99213 OFFICE O/P EST LOW 20 MIN: CPT | Performed by: INTERNAL MEDICINE

## 2020-04-03 PROCEDURE — 4004F PT TOBACCO SCREEN RCVD TLK: CPT | Performed by: INTERNAL MEDICINE

## 2020-04-03 PROCEDURE — G8427 DOCREV CUR MEDS BY ELIG CLIN: HCPCS | Performed by: INTERNAL MEDICINE

## 2020-04-03 RX ORDER — NALTREXONE HYDROCHLORIDE AND BUPROPION HYDROCHLORIDE 8; 90 MG/1; MG/1
2 TABLET, EXTENDED RELEASE ORAL 2 TIMES DAILY
Qty: 120 TABLET | Refills: 5 | Status: SHIPPED | OUTPATIENT
Start: 2020-04-03 | End: 2020-10-19 | Stop reason: ALTCHOICE

## 2020-04-03 RX ORDER — QUETIAPINE FUMARATE 400 MG/1
TABLET, FILM COATED ORAL
Qty: 180 TABLET | Refills: 1 | Status: SHIPPED | OUTPATIENT
Start: 2020-04-03 | End: 2020-08-25 | Stop reason: SDUPTHER

## 2020-04-03 RX ORDER — LORAZEPAM 2 MG/1
TABLET ORAL
Qty: 60 TABLET | Refills: 5 | Status: SHIPPED | OUTPATIENT
Start: 2020-04-03 | End: 2020-08-25 | Stop reason: SDUPTHER

## 2020-04-03 NOTE — PROGRESS NOTES
4/3/2020    TELEHEALTH EVALUATION -- Audio/Visual (During QZEGO-06 public health emergency)    [address]  [address]  [facility] [address]   Phone: [phone]           Patient Name: Dipika Jones    YOB: 1970    Today's Date: 4/3/20     Ulises Casey gave consent to participate in an audio/visual visit    Present at visit: patient was alone       Chief Complaint   Patient presents with    Anxiety          Subjective:  HPI   With the coronavirus pandemic patient does admit to increased anxiety. Primarily she is concerned about finances as she may facing 20% pay cut. She does feel though overall she is doing better with coping with her anxiety. She continues to take lorazepam every night. She continues on Seroquel. Her boyfriend is a good support. She is exercising more regularly. History:     Past Medical History:   Diagnosis Date    Depression     [facility]) ([facility])     post operative, left thigh    [facility] in [facility] ([facility] [address]     Ulcerative colitis ([facility] [address]     Vitamin B12 deficiency        Current Outpatient Medications on File Prior to Visit   Medication Sig Dispense Refill    celecoxib (CELEBREX) 200 MG capsule Take 200 mg by mouth      cyclobenzaprine (FLEXERIL) 10 MG tablet Take 10 mg by mouth      furosemide (LASIX) 40 MG tablet Take 1 tablet by mouth daily as needed (not reatining water every day) TAKE 1 TABLET BY MOUTH EVERY DAY 90 tablet 5    nystatin (MYCOSTATIN) 249072 UNIT/GM powder APPLY TOPICALLY 2 TIMES A DAY. 3     No current facility-administered medications on file prior to visit. Social History     Tobacco Use    Smoking status: Light Tobacco Smoker     Types: E-Cigarettes     Last attempt to quit: 1/1/2010     Years since quitting: 10.2    Smokeless tobacco: Never Used   Substance Use Topics    Alcohol use:  Yes     Alcohol/week: 7.0 standard drinks     Types: 7 Glasses of wine per week Review of Systems:    Review of Systems   All other systems reviewed and are negative. Objective: There were no vitals filed for this visit. Wt Readings from Last 3 Encounters:   10/03/19 159 lb (72.1 kg)   07/03/19 158 lb (71.7 kg)   02/11/19 159 lb (72.1 kg)       There is no height or weight on file to calculate BMI. Physical Exam  Constitutional:       Appearance: Normal appearance. She is normal weight. Neurological:      General: No focal deficit present. Mental Status: She is alert and oriented to person, place, and time. Psychiatric:         Mood and Affect: Mood normal.         Behavior: Behavior normal.         Thought Content: Thought content normal.         Judgment: Judgment normal.           Assessment:    1. Anxiety state  Stable  - LORazepam (ATIVAN) 2 MG tablet; TAKE 1 TO 2 TABLETS BY MOUTH EVERY EVENING BEFORE BED  Dispense: 60 tablet; Refill: 5  - QUEtiapine (SEROQUEL) 400 MG tablet; TAKE 2 TABLETS BY MOUTH EVERY DAY AT NIGHT  Dispense: 180 tablet; Refill: 1    2. Abnormal weight gain  Stable  - naltrexone-buPROPion (CONTRAVE) 8-90 MG per extended release tablet; Take 2 tablets by mouth 2 times daily  Dispense: 120 tablet; Refill: 5        Plan/Patient Instructions:    Patient Instructions   Continue lorazepam and Seroquel  Continue bupropion       Return in about 6 months (around 10/3/2020) for Anxiety. [address]       Documentation was done using voice recognition dragon software. Every effort was made to ensure accuracy; however, inadvertent, unintentional computerized transcription errors may be present.           Pursuant to the emergency declaration under the [facility], [facility] waiver authority and the [employer] Act, this Virtual  Visit was conducted, with patient's consent, to reduce the patient's risk of exposure to COVID-19 and provide continuity of care for an established patient. Services were provided through a video synchronous discussion virtually to substitute for in-person clinic visit.

## 2020-04-16 ENCOUNTER — TELEPHONE (OUTPATIENT)
Dept: INTERNAL MEDICINE CLINIC | Age: 50
End: 2020-04-16

## 2020-05-19 ENCOUNTER — TELEPHONE (OUTPATIENT)
Dept: INTERNAL MEDICINE CLINIC | Age: 50
End: 2020-05-19

## 2020-05-19 NOTE — TELEPHONE ENCOUNTER
Pt requesting a call back from dr Narvaez Emperor Sidney Morales. Pt did not want to tell me what it was regarding.

## 2020-05-26 ENCOUNTER — OFFICE VISIT (OUTPATIENT)
Dept: INTERNAL MEDICINE CLINIC | Age: 50
End: 2020-05-26
Payer: COMMERCIAL

## 2020-05-26 VITALS
BODY MASS INDEX: 25.7 KG/M2 | WEIGHT: 169 LBS | SYSTOLIC BLOOD PRESSURE: 100 MMHG | TEMPERATURE: 97.2 F | OXYGEN SATURATION: 98 % | HEART RATE: 90 BPM | DIASTOLIC BLOOD PRESSURE: 62 MMHG

## 2020-05-26 PROCEDURE — G8427 DOCREV CUR MEDS BY ELIG CLIN: HCPCS | Performed by: INTERNAL MEDICINE

## 2020-05-26 PROCEDURE — G8419 CALC BMI OUT NRM PARAM NOF/U: HCPCS | Performed by: INTERNAL MEDICINE

## 2020-05-26 PROCEDURE — 99213 OFFICE O/P EST LOW 20 MIN: CPT | Performed by: INTERNAL MEDICINE

## 2020-05-26 PROCEDURE — 4004F PT TOBACCO SCREEN RCVD TLK: CPT | Performed by: INTERNAL MEDICINE

## 2020-05-26 NOTE — PROGRESS NOTES
[address]  Phone: [phone]           Patient Name: Dipika Jones    YOB: 1970    Today's Date: 5/26/20           Chief Complaint   Patient presents with    Vaginal Pain     while sexual activity/ burning          Subjective:  Patient is having dyspareunia. There is a searing pain with intercourse. They tried 2 different vaginal lubricants without relief. Patient has always worn a tampon since her rectal surgery. Sometimes there is a reddish discharge. There is no odor. She continues to have menstrual cycles that are every 4 to 6 weeks. Her current partner is the same partner of over 2 years and the relationship is stable. History:     Past Medical History:   Diagnosis Date    Depression     [facility]) ([facility])     post operative, left thigh    [facility] in [facility] ([facility] [address]     Ulcerative colitis ([facility] [address]     Vitamin B12 deficiency        Current Outpatient Medications on File Prior to Visit   Medication Sig Dispense Refill    LORazepam (ATIVAN) 2 MG tablet TAKE 1 TO 2 TABLETS BY MOUTH EVERY EVENING BEFORE BED 60 tablet 5    QUEtiapine (SEROQUEL) 400 MG tablet TAKE 2 TABLETS BY MOUTH EVERY DAY AT NIGHT 180 tablet 1    naltrexone-buPROPion (CONTRAVE) 8-90 MG per extended release tablet Take 2 tablets by mouth 2 times daily 120 tablet 5    celecoxib (CELEBREX) 200 MG capsule Take 200 mg by mouth      cyclobenzaprine (FLEXERIL) 10 MG tablet Take 10 mg by mouth      furosemide (LASIX) 40 MG tablet Take 1 tablet by mouth daily as needed (not reatining water every day) TAKE 1 TABLET BY MOUTH EVERY DAY 90 tablet 5    nystatin (MYCOSTATIN) 253568 UNIT/GM powder APPLY TOPICALLY 2 TIMES A DAY. 3     No current facility-administered medications on file prior to visit.         Social History     Tobacco Use    Smoking status: Light Tobacco Smoker     Types: E-Cigarettes     Last attempt to Calmoseptine to irritated area            Return for As scheduled . [address]       Documentation was done using voice recognition dragon software. Every effort was made to ensure accuracy; however, inadvertent, unintentional computerized transcription errors may be present.

## 2020-05-27 LAB
C TRACH DNA GENITAL QL NAA+PROBE: NEGATIVE
CANDIDA SPECIES, DNA PROBE: NORMAL
GARDNERELLA VAGINALIS, DNA PROBE: NORMAL
N. GONORRHOEAE DNA: NEGATIVE
TRICHOMONAS VAGINALIS DNA: NORMAL

## 2020-05-29 LAB
ANAEROBIC CULTURE: ABNORMAL
GRAM STAIN RESULT: ABNORMAL
ORGANISM: ABNORMAL
ORGANISM: ABNORMAL
WOUND/ABSCESS: ABNORMAL
WOUND/ABSCESS: ABNORMAL

## 2020-06-23 ENCOUNTER — TELEPHONE (OUTPATIENT)
Dept: INTERNAL MEDICINE CLINIC | Age: 50
End: 2020-06-23

## 2020-06-23 NOTE — TELEPHONE ENCOUNTER
Patient called and is going out of town to visit with her sister and during her visit she will run out of her Lorazepam and wants to know if it is ok to be given an early refill to the pharmacy for  on 7/1.

## 2020-08-25 ENCOUNTER — VIRTUAL VISIT (OUTPATIENT)
Dept: INTERNAL MEDICINE CLINIC | Age: 50
End: 2020-08-25
Payer: COMMERCIAL

## 2020-08-25 PROCEDURE — G8427 DOCREV CUR MEDS BY ELIG CLIN: HCPCS | Performed by: NURSE PRACTITIONER

## 2020-08-25 PROCEDURE — 99212 OFFICE O/P EST SF 10 MIN: CPT | Performed by: NURSE PRACTITIONER

## 2020-08-25 RX ORDER — QUETIAPINE FUMARATE 400 MG/1
TABLET, FILM COATED ORAL
Qty: 180 TABLET | Refills: 1 | Status: SHIPPED | OUTPATIENT
Start: 2020-08-25 | End: 2021-04-06 | Stop reason: SDUPTHER

## 2020-08-25 RX ORDER — SULFAMETHOXAZOLE AND TRIMETHOPRIM 800; 160 MG/1; MG/1
1 TABLET ORAL 2 TIMES DAILY
Qty: 6 TABLET | Refills: 0 | Status: SHIPPED | OUTPATIENT
Start: 2020-08-25 | End: 2021-04-06 | Stop reason: SDUPTHER

## 2020-08-25 RX ORDER — LORAZEPAM 2 MG/1
TABLET ORAL
Qty: 60 TABLET | Refills: 5 | Status: CANCELLED | OUTPATIENT
Start: 2020-08-25 | End: 2021-01-16

## 2020-08-25 RX ORDER — LORAZEPAM 2 MG/1
TABLET ORAL
Qty: 60 TABLET | Refills: 5 | Status: SHIPPED | OUTPATIENT
Start: 2020-08-25 | End: 2020-09-28 | Stop reason: SDUPTHER

## 2020-08-25 ASSESSMENT — ENCOUNTER SYMPTOMS
RESPIRATORY NEGATIVE: 1
GASTROINTESTINAL NEGATIVE: 1
EYES NEGATIVE: 1

## 2020-08-25 NOTE — PROGRESS NOTES
2020    TELEHEALTH EVALUATION -- Audio/Visual (During VNORW-39 public health emergency)    Start Time 3:47  End Time 3:53    HPI: Requesting medication for UTI, following several surgeries with ileostomy, with intercourse, UTI's are frequent. Preparing to go on short vacation and requesting medication proactively before UTI occurs    Suzanne Tavera (:  1970) has requested an audio/video evaluation for the following concern(s):    uti    Review of Systems   Constitutional: Negative. HENT: Negative. Eyes: Negative. Respiratory: Negative. Cardiovascular: Negative. Gastrointestinal: Negative. Endocrine: Negative. Genitourinary:        Skin impairment, thin   Neurological: Negative. Hematological: Negative. Psychiatric/Behavioral: The patient is nervous/anxious. BP Readings from Last 3 Encounters:   20 100/62   10/03/19 98/60   19 104/76     Wt Readings from Last 3 Encounters:   20 169 lb (76.7 kg)   10/03/19 159 lb (72.1 kg)   19 158 lb (71.7 kg)     Prior to Visit Medications    Medication Sig Taking? Authorizing Provider   LORazepam (ATIVAN) 2 MG tablet TAKE 1 TO 2 TABLETS BY MOUTH EVERY EVENING BEFORE BED Yes Lisha Bright MD   QUEtiapine (SEROQUEL) 400 MG tablet TAKE 2 TABLETS BY MOUTH EVERY DAY AT NIGHT Yes Lisha Bright MD   naltrexone-buPROPion (CONTRAVE) 8-90 MG per extended release tablet Take 2 tablets by mouth 2 times daily Yes Lisha Bright MD   cyclobenzaprine (FLEXERIL) 10 MG tablet Take 10 mg by mouth Yes Historical Provider, MD   furosemide (LASIX) 40 MG tablet Take 1 tablet by mouth daily as needed (not reatining water every day) TAKE 1 TABLET BY MOUTH EVERY DAY Yes Lisha Bright MD   nystatin (MYCOSTATIN) 005989 UNIT/GM powder APPLY TOPICALLY 2 TIMES A DAY.  Yes Historical Provider, MD   celecoxib (CELEBREX) 200 MG capsule Take 200 mg by mouth  Historical Provider, MD       Social History     Tobacco Use    Asymmetry (Cranial nerve 7 motor function) (limited exam to video visit)          [] No gaze palsy        [] Abnormal-         Skin:        [] No significant exanthematous lesions or discoloration noted on facial skin         [] Abnormal-            Psychiatric:       [] Normal Affect [] No Hallucinations        [] Abnormal-     Other pertinent observable physical exam findings-     ASSESSMENT/PLAN:  UTI    -Take medication with food every 12 hours  -Drink plenty of water  -At first symptom of infection start medication    No follow-ups on file. Sammi Doyle is a 54 y.o. female being evaluated by a Virtual Visit (video visit) encounter to address concerns as mentioned above. A caregiver was present when appropriate. Due to this being a TeleHealth encounter ([facility]), evaluation of the following organ systems was limited: Vitals/Constitutional/EENT/Resp/CV/GI//MS/Neuro/Skin/Heme-Lymph-Imm. Pursuant to the emergency declaration under the [address] [place] [address] authority and the [employer] Act, this Virtual Visit was conducted with patient's (and/or legal guardian's) consent, to reduce the patient's risk of exposure to COVID-19 and provide necessary medical care. The patient (and/or legal guardian) has also been advised to contact this office for worsening conditions or problems, and seek emergency medical treatment and/or call 911 if deemed necessary. Patient identification was verified at the start of the visit: Yes    Total time spent on this encounter: 6 minutes    Services were provided through a video synchronous discussion virtually to substitute for in-person clinic visit. Patient and provider were located at their individual homes. --ELMER Horn - CNP on 8/25/2020 at 3:47 PM    An electronic signature was used to authenticate this note.

## 2020-09-28 ENCOUNTER — TELEPHONE (OUTPATIENT)
Dept: INTERNAL MEDICINE CLINIC | Age: 50
End: 2020-09-28

## 2020-09-28 RX ORDER — LORAZEPAM 2 MG/1
TABLET ORAL
Qty: 60 TABLET | Refills: 5 | Status: SHIPPED | OUTPATIENT
Start: 2020-09-28 | End: 2021-04-06 | Stop reason: SDUPTHER

## 2020-09-28 NOTE — TELEPHONE ENCOUNTER
----- Message from Juancarlos Waters sent at 9/28/2020  8:08 AM EDT -----  Subject: Refill Request    QUESTIONS  Name of Medication? LORazepam (ATIVAN) 2 MG tablet  Patient-reported dosage and instructions? 1-2 pills each night at bedtime  How many days do you have left? 0  Preferred Pharmacy? [id number] - [address] [phone] - [phone]  Pharmacy phone number (if available)? [phone]  Additional Information for Provider? Patient has no medication left.  ---------------------------------------------------------------------------  --------------  CALL BACK INFO  What is the best way for the office to contact you? OK to leave message on   voicemail  Preferred Call Back Phone Number?  [phone]

## 2020-10-19 ENCOUNTER — OFFICE VISIT (OUTPATIENT)
Dept: INTERNAL MEDICINE CLINIC | Age: 50
End: 2020-10-19
Payer: COMMERCIAL

## 2020-10-19 VITALS
OXYGEN SATURATION: 98 % | DIASTOLIC BLOOD PRESSURE: 68 MMHG | BODY MASS INDEX: 24.86 KG/M2 | HEIGHT: 68 IN | HEART RATE: 86 BPM | TEMPERATURE: 97.3 F | SYSTOLIC BLOOD PRESSURE: 100 MMHG | WEIGHT: 164 LBS

## 2020-10-19 DIAGNOSIS — Z13.1 SCREENING FOR DIABETES MELLITUS: ICD-10-CM

## 2020-10-19 DIAGNOSIS — Z11.4 SCREENING FOR HIV (HUMAN IMMUNODEFICIENCY VIRUS): ICD-10-CM

## 2020-10-19 DIAGNOSIS — Z13.220 SCREENING, LIPID: ICD-10-CM

## 2020-10-19 LAB
CHOLESTEROL, TOTAL: 205 MG/DL (ref 0–199)
GLUCOSE BLD-MCNC: 98 MG/DL (ref 70–99)
HDLC SERPL-MCNC: 102 MG/DL (ref 40–60)
LDL CHOLESTEROL CALCULATED: 91 MG/DL
TRIGL SERPL-MCNC: 61 MG/DL (ref 0–150)
VLDLC SERPL CALC-MCNC: 12 MG/DL

## 2020-10-19 PROCEDURE — 99396 PREV VISIT EST AGE 40-64: CPT | Performed by: INTERNAL MEDICINE

## 2020-10-19 PROCEDURE — G8484 FLU IMMUNIZE NO ADMIN: HCPCS | Performed by: INTERNAL MEDICINE

## 2020-10-19 RX ORDER — HYDROQUINONE 40 MG/G
CREAM TOPICAL
COMMUNITY
Start: 2020-07-13

## 2020-10-19 NOTE — PROGRESS NOTES
History and Physical      Lukasz Arndt  YOB: 1970    Date of Service:  10/19/2020    Chief Complaint:   Lukasz Arndt is a 52 y.o. female who presents for complete physical examination. HPI: Trying Keto. She is eating eggs, nitrate free gupta, fish. She is eating berries. Working with . Was not losing weight on weight watchers.      Wt Readings from Last 3 Encounters:   10/19/20 164 lb (74.4 kg)   05/26/20 169 lb (76.7 kg)   10/03/19 159 lb (72.1 kg)     BP Readings from Last 3 Encounters:   10/19/20 100/68   05/26/20 100/62   10/03/19 98/60       Patient Active Problem List   Diagnosis    Anxiety state    Ileostomy in place [facility])    Intestinal malabsorption    Ulcerative colitis ([facility] [address]       Preventive Care:  Health Maintenance   Topic Date Due    Pneumococcal 0-64 years Vaccine (1 of 1 - PPSV23) 09/30/1978    Cervical cancer screen  07/03/2024    Lipid screen  10/19/2025    DTaP/Tdap/Td vaccine (2 - Td) 09/12/2026    Flu vaccine  Completed    HIV screen  Completed    Hepatitis A vaccine  Aged Out    Hepatitis B vaccine  Aged Out    Hib vaccine  Aged Out    Meningococcal (ACWY) vaccine  Aged Out      Hx abnormal PAP: no  Self-breast exams: yes  Last eye exam: within the last yeaar, abnormal - glasses  Last dentist exam: overdue   Exercise: has a    Servings of veggies per day: daily  Servings of fruit per day: daily  Sugary beverages: no  Red Meat intake: 2-3 times per week   Seatbelt use: yes  Lipid panel:   Lab Results   Component Value Date    CHOL 205 (H) 10/19/2020    TRIG 61 10/19/2020     (H) 10/19/2020    [address] 91 10/19/2020        Advance Directive: N, <no information>    Immunization History   Administered Date(s) Administered    Influenza Vaccine, unspecified formulation 09/12/2016    Influenza Virus Vaccine 09/12/2016, 10/03/2017, 10/09/2018, 10/06/2020    Influenza, Quadv, IM, PF (6 mo and older Fluzone, Flulaval, Fluarix, and 3 yrs and older Afluria) 09/13/2019    Tdap (Boostrix, Adacel) 09/12/2016       Allergies   Allergen Reactions    Morphine Nausea Only     DOES NOT RELIEVE PAIN     Outpatient Medications Marked as Taking for the 10/19/20 encounter (Office Visit) with Kamini Akhtar MD   Medication Sig Dispense Refill    hydroquinone 4 % cream APPLY CREAM TOPICALLY TO THE AFFECTED AREA TWICE DAILY.  LORazepam (ATIVAN) 2 MG tablet TAKE 1 TO 2 TABLETS BY MOUTH EVERY EVENING BEFORE BED 60 tablet 5    QUEtiapine (SEROQUEL) 400 MG tablet TAKE 2 TABLETS BY MOUTH EVERY DAY AT NIGHT 180 tablet 1    celecoxib (CELEBREX) 200 MG capsule Take 200 mg by mouth      cyclobenzaprine (FLEXERIL) 10 MG tablet Take 10 mg by mouth      furosemide (LASIX) 40 MG tablet Take 1 tablet by mouth daily as needed (not reatining water every day) TAKE 1 TABLET BY MOUTH EVERY DAY 90 tablet 5    nystatin (MYCOSTATIN) 486406 UNIT/GM powder APPLY TOPICALLY 2 TIMES A DAY.   3       Past Medical History:   Diagnosis Date    Depression     [facility]deep venous thrombosis) ([facility])     post operative, left thigh    [facility] in place ([facility]     Ulcerative colitis ([facility]     Vitamin B12 deficiency      Past Surgical History:   Procedure Laterality Date    BREAST ENHANCEMENT SURGERY  2003    COLECTOMY  2006    COSMETIC SURGERY      medial thigh lift    RECTAL SURGERY  2008    Transanal rectal resection    DOLLY-EN-Y GASTRIC BYPASS  2001     Family History   Problem Relation Age of Onset    Coronary Art Dis Father     Cancer Father         Throat    COPD Father     Arthritis Father     Diabetes Mother     Arthritis Mother     Lupus Mother      Social History     Socioeconomic History    Marital status: Unknown     Spouse name: Not on file    Number of children: Not on file    Years of education: Not on file    Highest education level: Not on file   Occupational History    Not on file   Social Needs    Financial resource strain: Not on file   Ardyth Moritz Food insecurity     Worry: Not on file     Inability: Not on file    Transportation needs     Medical: Not on file     Non-medical: Not on file   Tobacco Use    Smoking status: Light Tobacco Smoker     Types: E-Cigarettes     Last attempt to quit: 1/1/2010     Years since quitting: 10.8    Smokeless tobacco: Never Used   Substance and Sexual Activity    Alcohol use: Yes     Alcohol/week: 7.0 standard drinks     Types: 7 Glasses of wine per week    Drug use: No    Sexual activity: Not on file   Lifestyle    Physical activity     Days per week: Not on file     Minutes per session: Not on file    Stress: Not on file   Relationships    Social connections     Talks on phone: Not on file     Gets together: Not on file     Attends Rastafarian service: Not on file     Active member of club or organization: Not on file     Attends meetings of clubs or organizations: Not on file     Relationship status: Not on file    Intimate partner violence     Fear of current or ex partner: Not on file     Emotionally abused: Not on file     Physically abused: Not on file     Forced sexual activity: Not on file   Other Topics Concern    Not on file   Social History Narrative    Not on file       Review of Systems:  A comprehensive review of systems was negative except for what was noted in the HPI. Physical Exam:   Vitals:    10/19/20 1505   BP: 100/68   Pulse: 86   Temp: 97.3 °F (36.3 °C)   TempSrc: Infrared   SpO2: 98%   Weight: 164 lb (74.4 kg)   Height: 5' 8\" (1.727 m)     Body mass index is 24.94 kg/m². There were no vitals filed for this visit. Constitutional: She is oriented to person, place, and time. She appears well-developed and well-nourished. No distress. HEENT:   Head: Normocephalic and atraumatic.    Right Ear: Tympanic membrane, external ear and ear canal normal.   Left Ear: Tympanic membrane, external ear and ear canal normal.   Nose: Nose normal.   Mouth/Throat: Oropharynx is clear and moist, and mucous membranes are normal.  There is no cervical adenopathy. Eyes: Conjunctivae and extraocular motions are normal. Pupils are equal, round, and reactive to light. Neck: Neck supple. No JVD present. Carotid bruit is not present. No mass and no thyromegaly present. Cardiovascular: Normal rate, regular rhythm, normal heart sounds and intact distal pulses. Exam reveals no gallop and no friction rub. No murmur heard. Pulmonary/Chest: Effort normal and breath sounds normal. No respiratory distress. She has no wheezes, rhonchi or rales. Abdominal: Soft, non-tender. Bowel sounds and aorta are normal. She exhibits no organomegaly, mass or bruit. Breast exam:  breasts appear normal, no suspicious masses, no skin or nipple changes or axillary nodes, s/p augmentation. Musculoskeletal: Normal range of motion, no synovitis. She exhibits no edema. Neurological: She is alert and oriented to person, place, and time. She has normal reflexes. No cranial nerve deficit. Coordination normal.   Skin: Skin is warm and dry. There is no rash or erythema. No suspicious lesions noted. Psychiatric: She has a normal mood and affect. Her speech is normal and behavior is normal. Judgment, cognition and memory are normal.     Assessment/Plan:    Lisandro Cerda was seen today for annual exam.    Diagnoses and all orders for this visit:    Well adult exam  -     DINA DIGITAL SCREEN W OR WO CAD BILATERAL; Future    Encounter for screening mammogram for malignant neoplasm of breast  -     DINA DIGITAL SCREEN W OR WO CAD BILATERAL; Future    Screening, lipid  -     Lipid Panel; Future    Screening for HIV (human immunodeficiency virus)  -     HIV Screen; Future    Screening for diabetes mellitus  -     GLUCOSE; Future            Patient Instructions   Keep up the healthy lifestyle           Niki Ram MD      Documentation was done using voice recognition dragon software.   Every effort was made to ensure accuracy; however, inadvertent, unintentional computerized transcription errors may be present.

## 2020-10-20 LAB
HIV AG/AB: NORMAL
HIV ANTIGEN: NORMAL
HIV-1 ANTIBODY: NORMAL
HIV-2 AB: NORMAL

## 2021-03-23 NOTE — TELEPHONE ENCOUNTER
Recent Visits  Date Type Provider Dept   10/19/20 Office Visit Anyi Barron MD [facility]   05/26/20 Office Visit Anyi Barron MD [facility]   10/03/19 Office Visit Anyi Barron MD [facility]   Showing recent visits within past 540 days with a meds authorizing provider and meeting all other requirements     Future Appointments  Date Type Provider Dept   04/06/21 Appointment Anyi Barron MD [facility]   Showing future appointments within next 150 days with a meds authorizing provider and meeting all other requirements      10/19/2020

## 2021-03-24 RX ORDER — FUROSEMIDE 40 MG/1
TABLET ORAL
Qty: 90 TABLET | Refills: 5 | Status: SHIPPED | OUTPATIENT
Start: 2021-03-24 | End: 2022-04-11

## 2021-04-06 ENCOUNTER — OFFICE VISIT (OUTPATIENT)
Dept: INTERNAL MEDICINE CLINIC | Age: 51
End: 2021-04-06
Payer: COMMERCIAL

## 2021-04-06 VITALS
DIASTOLIC BLOOD PRESSURE: 60 MMHG | BODY MASS INDEX: 23.57 KG/M2 | WEIGHT: 155 LBS | SYSTOLIC BLOOD PRESSURE: 100 MMHG | OXYGEN SATURATION: 95 % | HEART RATE: 78 BPM

## 2021-04-06 DIAGNOSIS — N39.0 URINARY TRACT INFECTION WITHOUT HEMATURIA, SITE UNSPECIFIED: ICD-10-CM

## 2021-04-06 DIAGNOSIS — R60.0 LOWER EXTREMITY EDEMA: ICD-10-CM

## 2021-04-06 DIAGNOSIS — F41.1 ANXIETY STATE: Primary | ICD-10-CM

## 2021-04-06 LAB
ALBUMIN SERPL-MCNC: 4.8 G/DL (ref 3.4–5)
ANION GAP SERPL CALCULATED.3IONS-SCNC: 14 MMOL/L (ref 3–16)
BUN BLDV-MCNC: 15 MG/DL (ref 7–20)
CALCIUM SERPL-MCNC: 9.3 MG/DL (ref 8.3–10.6)
CHLORIDE BLD-SCNC: 97 MMOL/L (ref 99–110)
CO2: 30 MMOL/L (ref 21–32)
CREAT SERPL-MCNC: 0.6 MG/DL (ref 0.6–1.1)
GFR AFRICAN AMERICAN: >60
GFR NON-AFRICAN AMERICAN: >60
GLUCOSE BLD-MCNC: 85 MG/DL (ref 70–99)
PHOSPHORUS: 4.6 MG/DL (ref 2.5–4.9)
POTASSIUM SERPL-SCNC: 4.7 MMOL/L (ref 3.5–5.1)
SODIUM BLD-SCNC: 141 MMOL/L (ref 136–145)

## 2021-04-06 PROCEDURE — 99214 OFFICE O/P EST MOD 30 MIN: CPT | Performed by: INTERNAL MEDICINE

## 2021-04-06 PROCEDURE — 36415 COLL VENOUS BLD VENIPUNCTURE: CPT | Performed by: INTERNAL MEDICINE

## 2021-04-06 PROCEDURE — G8427 DOCREV CUR MEDS BY ELIG CLIN: HCPCS | Performed by: INTERNAL MEDICINE

## 2021-04-06 PROCEDURE — G8420 CALC BMI NORM PARAMETERS: HCPCS | Performed by: INTERNAL MEDICINE

## 2021-04-06 PROCEDURE — 4004F PT TOBACCO SCREEN RCVD TLK: CPT | Performed by: INTERNAL MEDICINE

## 2021-04-06 RX ORDER — SULFAMETHOXAZOLE AND TRIMETHOPRIM 800; 160 MG/1; MG/1
1 TABLET ORAL 2 TIMES DAILY
Qty: 6 TABLET | Refills: 0 | Status: SHIPPED | OUTPATIENT
Start: 2021-04-06 | End: 2021-08-16 | Stop reason: SDUPTHER

## 2021-04-06 RX ORDER — LORAZEPAM 2 MG/1
TABLET ORAL
Qty: 60 TABLET | Refills: 5 | Status: SHIPPED | OUTPATIENT
Start: 2021-04-06 | End: 2021-09-14 | Stop reason: SDUPTHER

## 2021-04-06 RX ORDER — QUETIAPINE FUMARATE 400 MG/1
TABLET, FILM COATED ORAL
Qty: 180 TABLET | Refills: 1 | Status: SHIPPED | OUTPATIENT
Start: 2021-04-06 | End: 2021-09-20

## 2021-04-06 SDOH — ECONOMIC STABILITY: FOOD INSECURITY: WITHIN THE PAST 12 MONTHS, THE FOOD YOU BOUGHT JUST DIDN'T LAST AND YOU DIDN'T HAVE MONEY TO GET MORE.: NEVER TRUE

## 2021-04-06 ASSESSMENT — PATIENT HEALTH QUESTIONNAIRE - PHQ9
2. FEELING DOWN, DEPRESSED OR HOPELESS: 0
SUM OF ALL RESPONSES TO PHQ QUESTIONS 1-9: 0
SUM OF ALL RESPONSES TO PHQ9 QUESTIONS 1 & 2: 0

## 2021-04-06 NOTE — PROGRESS NOTES
Negative for dizziness, syncope and headaches. Physical Exam  Constitutional:       General: She is not in acute distress. Appearance: She is well-developed. HENT:      Head: Normocephalic. Mouth/Throat:      Pharynx: No oropharyngeal exudate. Cardiovascular:      Rate and Rhythm: Normal rate and regular rhythm. Heart sounds: Normal heart sounds. No murmur. Pulmonary:      Effort: Pulmonary effort is normal.      Breath sounds: Normal breath sounds. Abdominal:      General: There is no distension. Palpations: Abdomen is soft. Tenderness: There is no abdominal tenderness. Skin:     General: Skin is warm. Findings: No rash. An electronic signature was used to authenticate this note. --Myah Grider MD     Documentation was done using voice recognition dragon software. Every effort was made to ensure accuracy; however, inadvertent, unintentional computerized transcription errors may be present.

## 2021-04-08 PROBLEM — R60.0 LOWER EXTREMITY EDEMA: Status: ACTIVE | Noted: 2021-04-08

## 2021-04-08 ASSESSMENT — ENCOUNTER SYMPTOMS
EYE REDNESS: 0
SORE THROAT: 0
SINUS PRESSURE: 0
ABDOMINAL PAIN: 0
WHEEZING: 0
CHEST TIGHTNESS: 0
BACK PAIN: 0
VOMITING: 0
COUGH: 0
SHORTNESS OF BREATH: 0
RHINORRHEA: 0
NAUSEA: 0

## 2021-08-10 NOTE — TELEPHONE ENCOUNTER
Pt is leaving early for vacation needs approval to get early refill on the lorazepam pharmacy # [phone].  Will you call back PT when it is approved

## 2021-08-10 NOTE — TELEPHONE ENCOUNTER
Patient isn't leaving until 8/20 which is when she is due for a refill.  pharmacy was informed that she can  on 8/19

## 2021-08-16 ENCOUNTER — TELEPHONE (OUTPATIENT)
Dept: INTERNAL MEDICINE CLINIC | Age: 51
End: 2021-08-16

## 2021-08-16 DIAGNOSIS — N39.0 URINARY TRACT INFECTION WITHOUT HEMATURIA, SITE UNSPECIFIED: ICD-10-CM

## 2021-08-16 RX ORDER — SULFAMETHOXAZOLE AND TRIMETHOPRIM 800; 160 MG/1; MG/1
1 TABLET ORAL 2 TIMES DAILY
Qty: 6 TABLET | Refills: 0 | Status: SHIPPED | OUTPATIENT
Start: 2021-08-16 | End: 2021-12-03 | Stop reason: SDUPTHER

## 2021-08-16 NOTE — TELEPHONE ENCOUNTER
Patient is going on vacation soon. She is requesting medication to take w/her in case she develops a UTI while she is away. Patient is asymptomatic now but states she is prone to UTIs and normally gets medication to take w/her to avoid an Urgent Care visit while she is on vacation. [facility] confirmed.

## 2021-09-14 DIAGNOSIS — F41.1 ANXIETY STATE: ICD-10-CM

## 2021-09-14 RX ORDER — LORAZEPAM 2 MG/1
TABLET ORAL
Qty: 60 TABLET | Refills: 5 | Status: SHIPPED | OUTPATIENT
Start: 2021-09-14 | End: 2022-03-15 | Stop reason: SDUPTHER

## 2021-09-14 NOTE — TELEPHONE ENCOUNTER
Recent Visits  Date Type Provider Dept   04/06/21 Office Visit Melissa Marroquin MD [facility]   10/19/20 Office Visit Melissa Marroquin MD [facility]   05/26/20 Office Visit Melissa Marroquin MD [facility]   Showing recent visits within past 540 days with a meds authorizing provider and meeting all other requirements  Future Appointments  Date Type Provider Dept   11/01/21 Appointment Melissa Marroquin MD [facility]   Showing future appointments within next 150 days with a meds authorizing provider and meeting all other requirements     4/6/2021

## 2021-09-14 NOTE — TELEPHONE ENCOUNTER
----- Message from Cooper Yoo sent at 9/14/2021 10:21 AM EDT -----  Subject: Refill Request    QUESTIONS  Name of Medication? LORazepam (ATIVAN) 2 MG tablet  Patient-reported dosage and instructions? 2 at night as needed  How many days do you have left? 7  [id number]  Pharmacy phone number (if available)? [phone]  ---------------------------------------------------------------------------  --------------,  Name of Medication? QUEtiapine (SEROQUEL) 400 MG tablet  Patient-reported dosage and instructions? 2 per night  How many days do you have left? 7  Preferred Pharmacy? [id number]  Pharmacy phone number (if available)? [phone]  ---------------------------------------------------------------------------  --------------  Cynthia VIOSO  What is the best way for the office to contact you? OK to leave message on   voicemail  Preferred Call Back Phone Number?  [phone]

## 2021-09-18 DIAGNOSIS — F41.1 ANXIETY STATE: ICD-10-CM

## 2021-09-20 RX ORDER — QUETIAPINE FUMARATE 400 MG/1
TABLET, FILM COATED ORAL
Qty: 180 TABLET | Refills: 1 | Status: SHIPPED | OUTPATIENT
Start: 2021-09-20 | End: 2022-04-11

## 2021-09-20 NOTE — TELEPHONE ENCOUNTER
Recent Visits  Date Type Provider Dept   04/06/21 Office Visit Denia Duran MD [facility]   10/19/20 Office Visit Denia Duran MD [facility]   05/26/20 Office Visit Denia Duran MD [facility]   Showing recent visits within past 540 days with a meds authorizing provider and meeting all other requirements  Future Appointments  Date Type Provider Dept   11/01/21 Appointment Denia Duran MD [facility]   Showing future appointments within next 150 days with a meds authorizing provider and meeting all other requirements     4/6/2021

## 2021-11-01 ENCOUNTER — OFFICE VISIT (OUTPATIENT)
Dept: INTERNAL MEDICINE CLINIC | Age: 51
End: 2021-11-01
Payer: COMMERCIAL

## 2021-11-01 VITALS
WEIGHT: 163 LBS | DIASTOLIC BLOOD PRESSURE: 62 MMHG | SYSTOLIC BLOOD PRESSURE: 110 MMHG | HEART RATE: 86 BPM | HEIGHT: 68 IN | OXYGEN SATURATION: 97 % | BODY MASS INDEX: 24.71 KG/M2

## 2021-11-01 DIAGNOSIS — S83.282S ACUTE LATERAL MENISCUS TEAR OF LEFT KNEE, SEQUELA: ICD-10-CM

## 2021-11-01 DIAGNOSIS — N94.10 DYSPAREUNIA, FEMALE: ICD-10-CM

## 2021-11-01 DIAGNOSIS — Z01.818 PREOP GENERAL PHYSICAL EXAM: ICD-10-CM

## 2021-11-01 DIAGNOSIS — M17.12 PRIMARY OSTEOARTHRITIS OF LEFT KNEE: ICD-10-CM

## 2021-11-01 PROCEDURE — G8420 CALC BMI NORM PARAMETERS: HCPCS | Performed by: INTERNAL MEDICINE

## 2021-11-01 PROCEDURE — G8484 FLU IMMUNIZE NO ADMIN: HCPCS | Performed by: INTERNAL MEDICINE

## 2021-11-01 PROCEDURE — G8427 DOCREV CUR MEDS BY ELIG CLIN: HCPCS | Performed by: INTERNAL MEDICINE

## 2021-11-01 PROCEDURE — 99242 OFF/OP CONSLTJ NEW/EST SF 20: CPT | Performed by: INTERNAL MEDICINE

## 2021-11-01 RX ORDER — CONJUGATED ESTROGENS 0.62 MG/G
0.5 CREAM VAGINAL
Qty: 30 G | Refills: 3 | Status: SHIPPED | OUTPATIENT
Start: 2021-11-01 | End: 2022-09-19 | Stop reason: ALTCHOICE

## 2021-11-01 RX ORDER — FLUCONAZOLE 150 MG/1
150 TABLET ORAL ONCE
Qty: 3 TABLET | Refills: 2 | Status: SHIPPED | OUTPATIENT
Start: 2021-11-01 | End: 2022-09-16 | Stop reason: SDUPTHER

## 2021-11-01 NOTE — PATIENT INSTRUCTIONS
[address] [facility].   [place], [facility] [address]  Get Directions  Tel: [phone]    Pre op will faxed     Start Premarin cream

## 2021-11-01 NOTE — PROGRESS NOTES
Preoperative Consultation       Daya Mccall  YOB: 1970    Date of Service:  2021    Vitals:    21 1616   BP: 110/62   Pulse: 86   SpO2: 97%   Weight: 163 lb (73.9 kg)   Height: 5' 8\" (1.727 m)      Wt Readings from Last 2 Encounters:   21 163 lb (73.9 kg)   21 155 lb (70.3 kg)     BP Readings from Last 3 Encounters:   21 110/62   21 100/60   10/19/20 100/68        Chief Complaint   Patient presents with    Medication Refill    Pre-op Exam     / Dr. Belinda Santiago Left knee mencus tear/ [facility]     Allergies   Allergen Reactions    Hylan G-F 20 Swelling    Morphine Nausea Only     DOES NOT RELIEVE PAIN     Outpatient Medications Marked as Taking for the 21 encounter (Office Visit) with Bita Sharp MD   Medication Sig Dispense Refill    conjugated estrogens (PREMARIN) 0.625 MG/GM vaginal cream Place 0.5 g vaginally three times a week 30 g 3    [] fluconazole (DIFLUCAN) 150 MG tablet Take 1 tablet by mouth once for 1 dose Repeat in three days if no improvement 3 tablet 2    QUEtiapine (SEROQUEL) 400 MG tablet TAKE 2 TABLETS BY MOUTH EVERY NIGHT 180 tablet 1    LORazepam (ATIVAN) 2 MG tablet TAKE 1 TO 2 TABLETS BY MOUTH EVERY EVENING BEFORE BED 60 tablet 5    furosemide (LASIX) 40 MG tablet TAKE 1 TABLET BY MOUTH DAILY AS NEEDED FOR WATER RETENTION 90 tablet 5    hydroquinone 4 % cream APPLY CREAM TOPICALLY TO THE AFFECTED AREA TWICE DAILY.  celecoxib (CELEBREX) 200 MG capsule Take 200 mg by mouth as needed       nystatin (MYCOSTATIN) 891650 UNIT/GM powder APPLY TOPICALLY 2 TIMES A DAY. 3       This patient presentsto the office today for a preoperative consultation at the request of surgeon, Dr. Alcides Longoria, who plans on performing LEFT KNEE ARTHROSCOPY WITH POSSIBLE MENISCECTOMY AND CHONDROPLASTY, ARTHROSCOPIC LATERAL RELEASE, REMOVAL OF PATELLAR SPUR on  [facility].   The current problem began 5years ago, and symptoms havebeen worsening with time. Conservative therapy: N/A. Planned anesthesia: General  Known anesthesia problems: None  Bleeding risk: No recent or remote history of abnormal bleeding  Personal or FH of DVT/PE: No    Patient objection to receivingblood products: No       Patient Active Problem List   Diagnosis    Anxiety state    [facility] in place [facility])    Intestinal malabsorption    [facility] [address]    Lower extremity edema          Past Medical History:   Diagnosis Date    Acute superficial venous thrombosis of left lower extremity     post operative, left thigh    Depression     [facility] in place ([facility] [address]     Ulcerative colitis ([facility] [address]     Vitamin B12 deficiency      Past Surgical History:   Procedure Laterality Date    BREAST ENHANCEMENT SURGERY      COLECTOMY  2006    COSMETIC SURGERY      medial thigh lift    RECTAL SURGERY  2008    Transanal rectal resection    DOLLY-EN-Y GASTRIC BYPASS       Family History   Problem Relation Age of Onset    Diabetes Mother     Arthritis Mother     Lupus Mother     Coronary Art Dis Father     Cancer Father         Throat    COPD Father     Arthritis Father     Anesth Problems Neg Hx     Clotting Disorder Neg Hx     Bleeding Prob Neg Hx      Social History     Socioeconomic History    Marital status: Unknown     Spouse name: Not on file    Number of children: Not on file    Years of education: Not on file    Highest education level: Not on file   Occupational History    Not on file   Tobacco Use    Smoking status: Light Tobacco Smoker     Types: E-Cigarettes     Last attempt to quit: 2010     Years since quittin.8    Smokeless tobacco: Never Used   Substance and Sexual Activity    Alcohol use:  Yes     Alcohol/week: 7.0 standard drinks     Types: 7 Glasses of wine per week    Drug use: No    Sexual activity: Not on file   Other Topics Concern    Not on file   Social History Narrative    Not on file     Social Determinants of Health     Financial Resource Strain: Low Risk     Difficulty of Paying Living Expenses: Not hard at all   Food Insecurity: No Food Insecurity    Worried About Running Out of Food in the Last Year: Never true    Shashank of Food in the Last Year: Never true   Transportation Needs: No Transportation Needs    Lack of Transportation (Medical): No    Lack of Transportation (Non-Medical): No   Physical Activity:     Days of Exercise per Week:     Minutes of Exercise per Session:    Stress:     Feeling of Stress :    Social Connections:     Frequency of Communication with Friends and Family:     Frequency of Social Gatherings with Friends and Family:     Attends Quaker Services:     Active Member of Clubs or Organizations:     Attends Club or Organization Meetings:     Marital Status:    Intimate Partner Violence:     Fear of Current or Ex-Partner:     Emotionally Abused:     Physically Abused:     Sexually Abused:        Review of Systems   Review of Systems   Constitutional: Negative for fatigue and fever. HENT: Negative for ear pain, hearing loss, postnasal drip, rhinorrhea, sinus pressure, sore throat and tinnitus. Eyes: Negative for redness. Respiratory: Negative for cough, chest tightness, shortness of breath and wheezing. Cardiovascular: Negative for chest pain, palpitations and leg swelling. Gastrointestinal: Negative for abdominal pain, constipation, diarrhea, nausea and vomiting. Genitourinary: Negative for dysuria and frequency. Musculoskeletal: Positive for arthralgias. Negative for back pain and joint swelling. Skin: Negative for rash. Neurological: Negative for dizziness, syncope and headaches. PhysicalExam   Physical Exam  Constitutional:       Appearance: She is well-developed. HENT:      Head: Atraumatic.       Right Ear: Hearing, tympanic membrane, ear canal and external ear normal.      Left Ear: Hearing, tympanic membrane, ear canal and external ear normal.      Nose: Nose normal. No mucosal edema or rhinorrhea. Eyes:      General: No scleral icterus. Pupils: Pupils are equal, round, and reactive to light. Neck:      Thyroid: No thyroid mass or thyromegaly. Trachea: Trachea normal.   Cardiovascular:      Rate and Rhythm: Normal rate and regular rhythm. Heart sounds: Normal heart sounds, S1 normal and S2 normal. No murmur heard. Pulmonary:      Effort: Pulmonary effort is normal. No respiratory distress. Breath sounds: Normal breath sounds. No wheezing, rhonchi or rales. Abdominal:      General: The ostomy site is clean. Bowel sounds are normal.      Palpations: Abdomen is soft. Tenderness: There is no abdominal tenderness. Lymphadenopathy:      Cervical: No cervical adenopathy. Skin:     General: Skin is warm and dry. Findings: No rash. Neurological:      Mental Status: She is alert. Cranial Nerves: No cranial nerve deficit. Motor: No abnormal muscle tone. Gait: Gait normal.         EKG Interpretation:  Not indicated . Lab Review not applicable        Assessment:       54 y.o. patient with plannedsurgery as above. Known risk factors for perioperative complications: None  Currentmedications which may produce withdrawal symptoms if withheld perioperatively: none      Plan:     1. Preoperative workup as follows: electrolytes  2. Change in medication regimen before surgery: Discontinue NSAIDs (ibuprofen) 7 days before surgery  3. Prophylaxis for cardiac events with perioperative beta-blockers: Not indicated  4. Deep vein thrombosis prophylaxis:regimen to be chosen by surgical team and consider aspirin given history of superficial vein thrombosis  5.  No contraindications to planned surgery        Acute lateral meniscus tear of left knee, sequela  Primary osteoarthritis of left knee  -     Renal Function Panel  -     CBC Auto Differential  Preop general physical exam  Dyspareunia, female  -     FOLLICLE STIMULATING HORMONE  -     conjugated estrogens (PREMARIN) 0.625 MG/GM vaginal cream; Place 0.5 g vaginally three times a week, Vaginal, THREE TIMES WEEKLY Starting Mon 11/1/2021, Disp-30 g, R-3, Normal  -     fluconazole (DIFLUCAN) 150 MG tablet;  Take 1 tablet by mouth once for 1 dose Repeat in three days if no improvement, Disp-3 tablet, R-2Normal      Leatha Hayes MD

## 2021-11-01 NOTE — LETTER
Preoperative Consultation       Kathy Sorensen  YOB: 1970    Date of Service:  2021    Vitals:    21 1616   BP: 110/62   Pulse: 86   SpO2: 97%   Weight: 163 lb (73.9 kg)   Height: 5' 8\" (1.727 m)      Wt Readings from Last 2 Encounters:   21 163 lb (73.9 kg)   21 155 lb (70.3 kg)     BP Readings from Last 3 Encounters:   21 110/62   21 100/60   10/19/20 100/68        Chief Complaint   Patient presents with    Medication Refill    Pre-op Exam     / Dr. Carleen Link Left knee meniscus tear/ [facility]     Allergies   Allergen Reactions    Hylan G-F 20 Swelling    Morphine Nausea Only     DOES NOT RELIEVE PAIN     Outpatient Medications Marked as Taking for the 21 encounter (Office Visit) with Biju Sexton MD   Medication Sig Dispense Refill    conjugated estrogens (PREMARIN) 0.625 MG/GM vaginal cream Place 0.5 g vaginally three times a week 30 g 3    [] fluconazole (DIFLUCAN) 150 MG tablet Take 1 tablet by mouth once for 1 dose Repeat in three days if no improvement 3 tablet 2    QUEtiapine (SEROQUEL) 400 MG tablet TAKE 2 TABLETS BY MOUTH EVERY NIGHT 180 tablet 1    LORazepam (ATIVAN) 2 MG tablet TAKE 1 TO 2 TABLETS BY MOUTH EVERY EVENING BEFORE BED 60 tablet 5    furosemide (LASIX) 40 MG tablet TAKE 1 TABLET BY MOUTH DAILY AS NEEDED FOR WATER RETENTION 90 tablet 5    hydroquinone 4 % cream APPLY CREAM TOPICALLY TO THE AFFECTED AREA TWICE DAILY.  celecoxib (CELEBREX) 200 MG capsule Take 200 mg by mouth as needed       nystatin (MYCOSTATIN) 009787 UNIT/GM powder APPLY TOPICALLY 2 TIMES A DAY. 3       This patient presents to the office today for a preoperative consultation at the request of surgeon, Dr. Rishi Lamb, who plans on performing LEFT KNEE ARTHROSCOPY WITH POSSIBLE MENISCECTOMY AND CHONDROPLASTY, ARTHROSCOPIC LATERAL RELEASE, REMOVAL OF PATELLAR SPUR on  at [place].   The current problem began 5 years ago, and symptoms have been worsening with time. Conservative therapy: N/A. Planned anesthesia: General  Known anesthesia problems: None  Bleeding risk: No recent or remote history of abnormal bleeding  Personal or FH of DVT/PE: No    Patient objection to receiving blood products: No       Patient Active Problem List   Diagnosis    Anxiety state    Ileostomy in place [facility])    Intestinal malabsorption    [facility]ative [facility] ([facility] [address]    Lower extremity edema          Past Medical History:   Diagnosis Date    Acute superficial venous thrombosis of left lower extremity     post operative, left thigh    Depression     [facility] in place ([facility] [address]     Ulcerative colitis ([facility] [address]     Vitamin B12 deficiency      Past Surgical History:   Procedure Laterality Date    BREAST ENHANCEMENT SURGERY      COLECTOMY  2006    COSMETIC SURGERY      medial thigh lift    RECTAL SURGERY  2008    Transanal rectal resection    DOLLY-EN-Y GASTRIC BYPASS       Family History   Problem Relation Age of Onset    Diabetes Mother     Arthritis Mother     Lupus Mother     Coronary Art Dis Father     Cancer Father         Throat    COPD Father     Arthritis Father     Anesth Problems Neg Hx     Clotting Disorder Neg Hx     Bleeding Prob Neg Hx      Social History     Socioeconomic History    Marital status: Unknown     Spouse name: Not on file    Number of children: Not on file    Years of education: Not on file    Highest education level: Not on file   Occupational History    Not on file   Tobacco Use    Smoking status: Light Tobacco Smoker     Types: E-Cigarettes     Last attempt to quit: 2010     Years since quittin.8    Smokeless tobacco: Never Used   Substance and Sexual Activity    Alcohol use:  Yes     Alcohol/week: 7.0 standard drinks     Types: 7 Glasses of wine per week    Drug use: No    Sexual activity: Not on file   Other Topics Concern    Not on file   Social History Narrative    Not on file Social Determinants of Health     Financial Resource Strain: Low Risk     Difficulty of Paying Living Expenses: Not hard at all   Food Insecurity: No Food Insecurity    Worried About Running Out of Food in the Last Year: Never true    Shashank of Food in the Last Year: Never true   Transportation Needs: No Transportation Needs    Lack of Transportation (Medical): No    Lack of Transportation (Non-Medical): No   Physical Activity:     Days of Exercise per Week:     Minutes of Exercise per Session:    Stress:     Feeling of Stress :    Social Connections:     Frequency of Communication with Friends and Family:     Frequency of Social Gatherings with Friends and Family:     Attends Latter day Services:     Active Member of Clubs or Organizations:     Attends Club or Organization Meetings:     Marital Status:    Intimate Partner Violence:     Fear of Current or Ex-Partner:     Emotionally Abused:     Physically Abused:     Sexually Abused:        Review of Systems   Review of Systems   Constitutional: Negative for fatigue and fever. HENT: Negative for ear pain, hearing loss, postnasal drip, rhinorrhea, sinus pressure, sore throat and tinnitus. Eyes: Negative for redness. Respiratory: Negative for cough, chest tightness, shortness of breath and wheezing. Cardiovascular: Negative for chest pain, palpitations and leg swelling. Gastrointestinal: Negative for abdominal pain, constipation, diarrhea, nausea and vomiting. Genitourinary: Negative for dysuria and frequency. Musculoskeletal: Positive for arthralgias. Negative for back pain and joint swelling. Skin: Negative for rash. Neurological: Negative for dizziness, syncope and headaches. PhysicalExam   Physical Exam  Constitutional:       Appearance: She is well-developed. HENT:      Head: Atraumatic.       Right Ear: Hearing, tympanic membrane, ear canal and external ear normal.      Left Ear: Hearing, tympanic membrane, ear canal and external ear normal.      Nose: Nose normal. No mucosal edema or rhinorrhea. Eyes:      General: No scleral icterus. Pupils: Pupils are equal, round, and reactive to light. Neck:      Thyroid: No thyroid mass or thyromegaly. Trachea: Trachea normal.   Cardiovascular:      Rate and Rhythm: Normal rate and regular rhythm. Heart sounds: Normal heart sounds, S1 normal and S2 normal. No murmur heard. Pulmonary:      Effort: Pulmonary effort is normal. No respiratory distress. Breath sounds: Normal breath sounds. No wheezing, rhonchi or rales. Abdominal:      General: The ostomy site is clean. Bowel sounds are normal.      Palpations: Abdomen is soft. Tenderness: There is no abdominal tenderness. Lymphadenopathy:      Cervical: No cervical adenopathy. Skin:     General: Skin is warm and dry. Findings: No rash. Neurological:      Mental Status: She is alert. Cranial Nerves: No cranial nerve deficit. Motor: No abnormal muscle tone. Gait: Gait normal.         EKG Interpretation:  Not indicated . Lab Review not applicable        Assessment:       54 y.o. patient with plannedsurgery as above. Known risk factors for perioperative complications: None  Currentmedications which may produce withdrawal symptoms if withheld perioperatively: none      Plan:     1. Preoperative workup as follows: electrolytes  2. Change in medication regimen before surgery: Discontinue NSAIDs (ibuprofen) 7 days before surgery  3. Prophylaxis for cardiac events with perioperative beta-blockers: Not indicated  4. Deep vein thrombosis prophylaxis:regimen to be chosen by surgical team and consider aspirin given history of superficial vein thrombosis  5.  No contraindications to planned surgery        Acute lateral meniscus tear of left knee, sequela  Primary osteoarthritis of left knee  -     Renal Function Panel  -     CBC Auto Differential  Preop general physical exam  Dyspareunia, female  -     FOLLICLE STIMULATING HORMONE  -     conjugated estrogens (PREMARIN) 0.625 MG/GM vaginal cream; Place 0.5 g vaginally three times a week, Vaginal, THREE TIMES WEEKLY Starting Mon 11/1/2021, Disp-30 g, R-3, Normal  -     fluconazole (DIFLUCAN) 150 MG tablet;  Take 1 tablet by mouth once for 1 dose Repeat in three days if no improvement, Disp-3 tablet, R-2Normal      Steffany Dale MD

## 2021-11-04 ASSESSMENT — ENCOUNTER SYMPTOMS
RHINORRHEA: 0
COUGH: 0
DIARRHEA: 0
SHORTNESS OF BREATH: 0
BACK PAIN: 0
ABDOMINAL PAIN: 0
EYE REDNESS: 0
NAUSEA: 0
VOMITING: 0
WHEEZING: 0
CHEST TIGHTNESS: 0
CONSTIPATION: 0
SINUS PRESSURE: 0
SORE THROAT: 0

## 2021-11-12 LAB
BASOPHILS ABSOLUTE: 0 K/UL (ref 0–0.2)
BASOPHILS RELATIVE PERCENT: 0.5 %
EOSINOPHILS ABSOLUTE: 0.1 K/UL (ref 0–0.6)
EOSINOPHILS RELATIVE PERCENT: 1.1 %
HCT VFR BLD CALC: 41.1 % (ref 36–48)
HEMOGLOBIN: 13.7 G/DL (ref 12–16)
LYMPHOCYTES ABSOLUTE: 1.2 K/UL (ref 1–5.1)
LYMPHOCYTES RELATIVE PERCENT: 13.6 %
MCH RBC QN AUTO: 30 PG (ref 26–34)
MCHC RBC AUTO-ENTMCNC: 33.3 G/DL (ref 31–36)
MCV RBC AUTO: 90.1 FL (ref 80–100)
MONOCYTES ABSOLUTE: 0.9 K/UL (ref 0–1.3)
MONOCYTES RELATIVE PERCENT: 9.7 %
NEUTROPHILS ABSOLUTE: 6.8 K/UL (ref 1.7–7.7)
NEUTROPHILS RELATIVE PERCENT: 75.1 %
PDW BLD-RTO: 14.8 % (ref 12.4–15.4)
PLATELET # BLD: 185 K/UL (ref 135–450)
PMV BLD AUTO: 10.9 FL (ref 5–10.5)
RBC # BLD: 4.56 M/UL (ref 4–5.2)
WBC # BLD: 9.1 K/UL (ref 4–11)

## 2021-11-13 LAB
ALBUMIN SERPL-MCNC: 4.4 G/DL (ref 3.4–5)
ANION GAP SERPL CALCULATED.3IONS-SCNC: 15 MMOL/L (ref 3–16)
BUN BLDV-MCNC: 23 MG/DL (ref 7–20)
CALCIUM SERPL-MCNC: 9.5 MG/DL (ref 8.3–10.6)
CHLORIDE BLD-SCNC: 94 MMOL/L (ref 99–110)
CO2: 28 MMOL/L (ref 21–32)
CREAT SERPL-MCNC: 0.6 MG/DL (ref 0.6–1.1)
FOLLICLE STIMULATING HORMONE: 26.6 MIU/ML
GFR AFRICAN AMERICAN: >60
GFR NON-AFRICAN AMERICAN: >60
GLUCOSE BLD-MCNC: 96 MG/DL (ref 70–99)
PHOSPHORUS: 2.9 MG/DL (ref 2.5–4.9)
POTASSIUM SERPL-SCNC: 4.4 MMOL/L (ref 3.5–5.1)
SODIUM BLD-SCNC: 137 MMOL/L (ref 136–145)

## 2021-12-02 ENCOUNTER — TELEPHONE (OUTPATIENT)
Dept: INTERNAL MEDICINE CLINIC | Age: 51
End: 2021-12-02

## 2021-12-02 DIAGNOSIS — N39.0 URINARY TRACT INFECTION WITHOUT HEMATURIA, SITE UNSPECIFIED: ICD-10-CM

## 2021-12-02 NOTE — TELEPHONE ENCOUNTER
Patient called in regarding UTI symptoms    Patient is stating that she has burning and urgency (2 days)    Patient stating that she has taken over the counter medication- not working    Phone: [phone]

## 2021-12-03 RX ORDER — SULFAMETHOXAZOLE AND TRIMETHOPRIM 800; 160 MG/1; MG/1
1 TABLET ORAL 2 TIMES DAILY
Qty: 6 TABLET | Refills: 0 | Status: SHIPPED | OUTPATIENT
Start: 2021-12-03 | End: 2022-05-02 | Stop reason: SDUPTHER

## 2022-01-31 ENCOUNTER — TELEPHONE (OUTPATIENT)
Dept: INTERNAL MEDICINE CLINIC | Age: 52
End: 2022-01-31

## 2022-02-17 ENCOUNTER — TELEPHONE (OUTPATIENT)
Dept: INTERNAL MEDICINE CLINIC | Age: 52
End: 2022-02-17

## 2022-02-17 NOTE — TELEPHONE ENCOUNTER
Recent Visits  Date Type Provider Dept   11/01/21 Office Visit Aretha Marin MD [facility]   04/06/21 Office Visit Aretha Marin MD [facility]   10/19/20 Office Visit Aretha Marin MD [facility]   Showing recent visits within past 540 days with a meds authorizing provider and meeting all other requirements  Future Appointments  Date Type Provider Dept   05/02/22 Appointment Aretha Marin MD [facility]   Showing future appointments within next 150 days with a meds authorizing provider and meeting all other requirements     11/1/2021

## 2022-02-17 NOTE — TELEPHONE ENCOUNTER
----- Message from Dara Kidney sent at 2/17/2022  9:46 AM EST -----  Subject: Message to Provider    QUESTIONS  Information for Provider? Pt called in and stated want to try and lose   weight and want to know if provider can put in blood work order so pt can   put it through insuarce instead of self pay .  ---------------------------------------------------------------------------  --------------  [address]  What is the best way for the office to contact you? OK to leave message on   voicemail  Preferred Call Back Phone Number? [phone]  ---------------------------------------------------------------------------  --------------  SCRIPT ANSWERS  Relationship to Patient?  Self

## 2022-03-15 ENCOUNTER — TELEPHONE (OUTPATIENT)
Dept: INTERNAL MEDICINE CLINIC | Age: 52
End: 2022-03-15

## 2022-03-15 DIAGNOSIS — F41.1 ANXIETY STATE: ICD-10-CM

## 2022-03-15 RX ORDER — LORAZEPAM 2 MG/1
TABLET ORAL
Qty: 60 TABLET | Refills: 5 | Status: SHIPPED | OUTPATIENT
Start: 2022-03-15 | End: 2022-08-30 | Stop reason: SDUPTHER

## 2022-03-15 NOTE — TELEPHONE ENCOUNTER
----- Message from [facility] sent at 3/14/2022  5:36 PM EDT -----  Subject: Refill Request    QUESTIONS  Name of Medication? LORazepam (ATIVAN) 2 MG tablet  Patient-reported dosage and instructions? 2x nightly  How many days do you have left? 1  Preferred Pharmacy? [id number]  Pharmacy phone number (if available)? [phone]  ---------------------------------------------------------------------------  --------------  Avelina GREEN  What is the best way for the office to contact you? OK to leave message on   voicemail  Preferred Call Back Phone Number?  [phone]

## 2022-03-15 NOTE — TELEPHONE ENCOUNTER
Patient is requesting a refill of their prescription.     Requested Prescriptions      No prescriptions requested or ordered in this encounter        Recent Visits  Date Type Provider Dept   11/01/21 Office Visit Naya Soto MD [facility]   04/06/21 Office Visit Naya Soto MD [facility]   10/19/20 Office Visit Naya Soto MD [facility]   Showing recent visits within past 540 days with a meds authorizing provider and meeting all other requirements  Future Appointments  Date Type Provider Dept   05/02/22 Appointment Naya Soto MD [facility]   Showing future appointments within next 150 days with a meds authorizing provider and meeting all other requirements     11/1/2021

## 2022-04-10 DIAGNOSIS — F41.1 ANXIETY STATE: ICD-10-CM

## 2022-04-11 RX ORDER — QUETIAPINE FUMARATE 400 MG/1
TABLET, FILM COATED ORAL
Qty: 180 TABLET | Refills: 1 | Status: SHIPPED | OUTPATIENT
Start: 2022-04-11 | End: 2022-07-11

## 2022-04-11 RX ORDER — FUROSEMIDE 40 MG/1
TABLET ORAL
Qty: 90 TABLET | Refills: 5 | Status: SHIPPED | OUTPATIENT
Start: 2022-04-11

## 2022-04-11 NOTE — TELEPHONE ENCOUNTER
Patient is requesting a refill of their prescription.     Requested Prescriptions     Pending Prescriptions Disp Refills    QUEtiapine (SEROQUEL) 400 MG tablet [Pharmacy Med Name: QUETIAPINE FUMARATE 400 MG TAB] 180 tablet 1     Sig: TAKE 2 TABLETS BY MOUTH EVERY NIGHT        Recent Visits  Date Type Provider Dept   11/01/21 Office Visit Jackson Alatorre MD [facility]   04/06/21 Office Visit [facility]atorre [facility]   10/19/20 Office Visit Jackson Alatorre MD [facility]   Showing recent visits within past 540 days with a meds authorizing provider and meeting all other requirements  Future Appointments  Date Type Provider Dept   05/02/22 Appointment Jackson Alatorre MD [facility]   Showing future appointments within next 150 days with a meds authorizing provider and meeting all other requirements     11/1/2021

## 2022-04-11 NOTE — TELEPHONE ENCOUNTER
Patient is requesting a refill of their prescription.     Requested Prescriptions     Pending Prescriptions Disp Refills    furosemide (LASIX) 40 MG tablet [Pharmacy Med Name: FUROSEMIDE 40 MG TABLET] 90 tablet 5     Sig: TAKE 1 TABLET BY MOUTH DAILY AS NEEDED FOR WATER RETENTION        Recent Visits  Date Type Provider Dept   11/01/21 Office Visit Porsha Colon MD [facility]   04/06/21 Office Visit Porsha Colon MD [facility]   10/19/20 Office Visit Porsha Colon MD [facility]   Showing recent visits within past 540 days with a meds authorizing provider and meeting all other requirements  Future Appointments  Date Type Provider Dept   05/02/22 Appointment Porsha Colon MD [facility]   Showing future appointments within next 150 days with a meds authorizing provider and meeting all other requirements     11/1/2021

## 2022-05-02 ENCOUNTER — OFFICE VISIT (OUTPATIENT)
Dept: INTERNAL MEDICINE CLINIC | Age: 52
End: 2022-05-02
Payer: COMMERCIAL

## 2022-05-02 VITALS
SYSTOLIC BLOOD PRESSURE: 120 MMHG | BODY MASS INDEX: 23.9 KG/M2 | HEART RATE: 120 BPM | OXYGEN SATURATION: 98 % | WEIGHT: 157.2 LBS | DIASTOLIC BLOOD PRESSURE: 80 MMHG | TEMPERATURE: 97.4 F

## 2022-05-02 DIAGNOSIS — N94.10 DYSPAREUNIA, FEMALE: ICD-10-CM

## 2022-05-02 DIAGNOSIS — N39.0 URINARY TRACT INFECTION WITHOUT HEMATURIA, SITE UNSPECIFIED: ICD-10-CM

## 2022-05-02 DIAGNOSIS — M54.50 ACUTE MIDLINE LOW BACK PAIN WITHOUT SCIATICA: Primary | ICD-10-CM

## 2022-05-02 PROCEDURE — 4004F PT TOBACCO SCREEN RCVD TLK: CPT | Performed by: INTERNAL MEDICINE

## 2022-05-02 PROCEDURE — G8420 CALC BMI NORM PARAMETERS: HCPCS | Performed by: INTERNAL MEDICINE

## 2022-05-02 PROCEDURE — G8427 DOCREV CUR MEDS BY ELIG CLIN: HCPCS | Performed by: INTERNAL MEDICINE

## 2022-05-02 PROCEDURE — 99214 OFFICE O/P EST MOD 30 MIN: CPT | Performed by: INTERNAL MEDICINE

## 2022-05-02 RX ORDER — SULFAMETHOXAZOLE AND TRIMETHOPRIM 800; 160 MG/1; MG/1
1 TABLET ORAL 2 TIMES DAILY
Qty: 6 TABLET | Refills: 1 | Status: SHIPPED | OUTPATIENT
Start: 2022-05-02 | End: 2022-05-05

## 2022-05-02 RX ORDER — ESTRADIOL 10 UG/1
10 INSERT VAGINAL
Qty: 12 TABLET | Refills: 5 | Status: SHIPPED | OUTPATIENT
Start: 2022-05-02 | End: 2022-09-19 | Stop reason: ALTCHOICE

## 2022-05-02 RX ORDER — METHYLPREDNISOLONE 4 MG/1
TABLET ORAL
Qty: 1 KIT | Refills: 0 | Status: SHIPPED | OUTPATIENT
Start: 2022-05-02 | End: 2022-05-08

## 2022-05-02 SDOH — ECONOMIC STABILITY: FOOD INSECURITY: WITHIN THE PAST 12 MONTHS, THE FOOD YOU BOUGHT JUST DIDN'T LAST AND YOU DIDN'T HAVE MONEY TO GET MORE.: NEVER TRUE

## 2022-05-02 SDOH — ECONOMIC STABILITY: FOOD INSECURITY: WITHIN THE PAST 12 MONTHS, YOU WORRIED THAT YOUR FOOD WOULD RUN OUT BEFORE YOU GOT MONEY TO BUY MORE.: NEVER TRUE

## 2022-05-02 ASSESSMENT — ANXIETY QUESTIONNAIRES
7. FEELING AFRAID AS IF SOMETHING AWFUL MIGHT HAPPEN: 0
1. FEELING NERVOUS, ANXIOUS, OR ON EDGE: 0
3. WORRYING TOO MUCH ABOUT DIFFERENT THINGS: 0
GAD7 TOTAL SCORE: 0
5. BEING SO RESTLESS THAT IT IS HARD TO SIT STILL: 0
2. NOT BEING ABLE TO STOP OR CONTROL WORRYING: 0
6. BECOMING EASILY ANNOYED OR IRRITABLE: 0
4. TROUBLE RELAXING: 0
IF YOU CHECKED OFF ANY PROBLEMS ON THIS QUESTIONNAIRE, HOW DIFFICULT HAVE THESE PROBLEMS MADE IT FOR YOU TO DO YOUR WORK, TAKE CARE OF THINGS AT HOME, OR GET ALONG WITH OTHER PEOPLE: NOT DIFFICULT AT ALL

## 2022-05-02 ASSESSMENT — PATIENT HEALTH QUESTIONNAIRE - PHQ9
2. FEELING DOWN, DEPRESSED OR HOPELESS: 0
1. LITTLE INTEREST OR PLEASURE IN DOING THINGS: 0
SUM OF ALL RESPONSES TO PHQ QUESTIONS 1-9: 0
5. POOR APPETITE OR OVEREATING: 0
4. FEELING TIRED OR HAVING LITTLE ENERGY: 0
SUM OF ALL RESPONSES TO PHQ9 QUESTIONS 1 & 2: 0
SUM OF ALL RESPONSES TO PHQ QUESTIONS 1-9: 0
7. TROUBLE CONCENTRATING ON THINGS, SUCH AS READING THE NEWSPAPER OR WATCHING TELEVISION: 0
SUM OF ALL RESPONSES TO PHQ QUESTIONS 1-9: 0
3. TROUBLE FALLING OR STAYING ASLEEP: 0
6. FEELING BAD ABOUT YOURSELF - OR THAT YOU ARE A FAILURE OR HAVE LET YOURSELF OR YOUR FAMILY DOWN: 0
SUM OF ALL RESPONSES TO PHQ QUESTIONS 1-9: 0
8. MOVING OR SPEAKING SO SLOWLY THAT OTHER PEOPLE COULD HAVE NOTICED. OR THE OPPOSITE, BEING SO FIGETY OR RESTLESS THAT YOU HAVE BEEN MOVING AROUND A LOT MORE THAN USUAL: 0
9. THOUGHTS THAT YOU WOULD BE BETTER OFF DEAD, OR OF HURTING YOURSELF: 0
10. IF YOU CHECKED OFF ANY PROBLEMS, HOW DIFFICULT HAVE THESE PROBLEMS MADE IT FOR YOU TO DO YOUR WORK, TAKE CARE OF THINGS AT HOME, OR GET ALONG WITH OTHER PEOPLE: 0

## 2022-05-02 ASSESSMENT — SOCIAL DETERMINANTS OF HEALTH (SDOH): HOW HARD IS IT FOR YOU TO PAY FOR THE VERY BASICS LIKE FOOD, HOUSING, MEDICAL CARE, AND HEATING?: NOT HARD AT ALL

## 2022-05-02 NOTE — PATIENT INSTRUCTIONS
Back pain  Start Medrol Dose Jasbir  If no improvement, refer to PT    Recurrent UTI  Script for Bactrim sent to pharmacy with refills    Vaginal dryness  Start Estradiol tablet       Mood   Continue Seroquel and Lorazepam

## 2022-05-02 NOTE — PROGRESS NOTES
Alison Weeks (:  1970) is a 54 y.o. female,Established patient, here for evaluation of the following chief complaint(s):  Dyspareunia (6 months )      ASSESSMENT/PLAN:  1. Acute midline low back pain without sciatica  -     methylPREDNISolone (MEDROL DOSEPACK) 4 MG tablet; Take by mouth., Disp-1 kit, R-0Normal  2. Urinary tract infection without hematuria, site unspecified  -     sulfamethoxazole-trimethoprim (BACTRIM DS;SEPTRA DS) 800-160 MG per tablet; Take 1 tablet by mouth 2 times daily for 3 days, Disp-6 tablet, R-1Normal  3. Dyspareunia, female  -     Estradiol (VAGIFEM) 10 MCG TABS vaginal tablet; Place 1 tablet vaginally three times a week, Disp-12 tablet, R-5Print      Patient Instructions   Back pain  Start Medrol Dose Jasbir  If no improvement, refer to PT    Recurrent UTI  Script for Bactrim sent to pharmacy with refills    Vaginal dryness  Start Estradiol tablet       Mood   Continue Seroquel and Lorazepam        Return in about 6 months (around 2022) for Pap Smear . SUBJECTIVE/OBJECTIVE:  HPI  Healthy Weight/Endorine  [facility]  Ozempic 0.17 mg weekly. Weight increased to 175 post op- Fe. She thinks she saw a dietitian  Recommended Thyroid replacement, estrogen/progesterone  Goal weight is 145-150. Tries to stick with complex carbs  Will sometimes fast on the weekends    Back pain  Went to chiropractor  Was helping her mom move apartmneents  Felt like she coundl't straighten her back  Saw ortho for her knee and they offered her a muscle relaxer- FLexeril did not help   Started about a month ago  Did not radiate       Recurrent UTI  Requesting antibiotic       Dyspareunia  Did not use premarin cream      Mood  \"Fine\"  When she thinks abotu work she get overwhelmed  Sleeping well        Review of Systems   All other systems reviewed and are negative.     Vitals:    22 1446   BP: 120/80   Site: Right Upper Arm   Position: Sitting   Cuff Size: Medium Adult   Pulse: 120   Temp: 97.4 °F (36.3 °C)   SpO2: 98%   Weight: 157 lb 3.2 oz (71.3 kg)      Wt Readings from Last 3 Encounters:   05/02/22 157 lb 3.2 oz (71.3 kg)   11/01/21 163 lb (73.9 kg)   04/06/21 155 lb (70.3 kg)        Physical Exam  Constitutional:       General: She is not in acute distress. Appearance: She is well-developed. HENT:      Head: Normocephalic. Mouth/Throat:      Pharynx: No oropharyngeal exudate. Cardiovascular:      Rate and Rhythm: Normal rate and regular rhythm. Heart sounds: Normal heart sounds. No murmur heard. Pulmonary:      Effort: Pulmonary effort is normal.      Breath sounds: Normal breath sounds. Abdominal:      General: There is no distension. Palpations: Abdomen is soft. Tenderness: There is no abdominal tenderness. Skin:     General: Skin is warm. Findings: No rash. An electronic signature was used to authenticate this note. --Winifred Borrero MD     Documentation was done using voice recognition dragon software. Every effort was made to ensure accuracy; however, inadvertent, unintentional computerized transcription errors may be present.

## 2022-07-05 ENCOUNTER — TELEPHONE (OUTPATIENT)
Dept: INTERNAL MEDICINE CLINIC | Age: 52
End: 2022-07-05

## 2022-07-05 NOTE — TELEPHONE ENCOUNTER
Patient is requesting an urgent appointment for torso pain, she stated it feels bruised anytime she touches it or puts clothes on.

## 2022-07-06 ENCOUNTER — OFFICE VISIT (OUTPATIENT)
Dept: INTERNAL MEDICINE CLINIC | Age: 52
End: 2022-07-06
Payer: COMMERCIAL

## 2022-07-06 VITALS
DIASTOLIC BLOOD PRESSURE: 60 MMHG | HEART RATE: 78 BPM | SYSTOLIC BLOOD PRESSURE: 106 MMHG | OXYGEN SATURATION: 98 % | WEIGHT: 151 LBS | BODY MASS INDEX: 22.96 KG/M2

## 2022-07-06 DIAGNOSIS — R20.8 PAIN OF SKIN: Primary | ICD-10-CM

## 2022-07-06 PROCEDURE — 4004F PT TOBACCO SCREEN RCVD TLK: CPT | Performed by: NURSE PRACTITIONER

## 2022-07-06 PROCEDURE — 99213 OFFICE O/P EST LOW 20 MIN: CPT | Performed by: NURSE PRACTITIONER

## 2022-07-06 PROCEDURE — G8427 DOCREV CUR MEDS BY ELIG CLIN: HCPCS | Performed by: NURSE PRACTITIONER

## 2022-07-06 PROCEDURE — G8420 CALC BMI NORM PARAMETERS: HCPCS | Performed by: NURSE PRACTITIONER

## 2022-07-06 RX ORDER — SULFAMETHOXAZOLE AND TRIMETHOPRIM 800; 160 MG/1; MG/1
TABLET ORAL
COMMUNITY
Start: 2022-06-07 | End: 2022-09-16 | Stop reason: SDUPTHER

## 2022-07-06 RX ORDER — VALACYCLOVIR HYDROCHLORIDE 500 MG/1
500 TABLET, FILM COATED ORAL 2 TIMES DAILY
Qty: 14 TABLET | Refills: 0 | Status: SHIPPED | OUTPATIENT
Start: 2022-07-06 | End: 2022-07-13

## 2022-07-07 ASSESSMENT — ENCOUNTER SYMPTOMS
GASTROINTESTINAL NEGATIVE: 1
RESPIRATORY NEGATIVE: 1
EYES NEGATIVE: 1
ALLERGIC/IMMUNOLOGIC NEGATIVE: 1

## 2022-07-09 DIAGNOSIS — F41.1 ANXIETY STATE: ICD-10-CM

## 2022-07-11 RX ORDER — QUETIAPINE FUMARATE 400 MG/1
TABLET, FILM COATED ORAL
Qty: 180 TABLET | Refills: 1 | Status: SHIPPED | OUTPATIENT
Start: 2022-07-11 | End: 2022-09-16 | Stop reason: SDUPTHER

## 2022-07-11 NOTE — TELEPHONE ENCOUNTER
Recent Visits  Date Type Provider Dept   07/06/22 Office Visit ELMER Rzizo - CNP [facility]   05/02/22 Office Visit Zeyad Cardoso MD [facility]   11/01/21 Office Visit Zeyad Cardoso MD [facility]   04/06/21 Office Visit Zeyad Cardoso MD [facility]   Showing recent visits within past 540 days with a meds authorizing provider and meeting all other requirements  Future Appointments  Date Type Provider Dept   11/21/22 Appointment Zeyad Cardoso MD [facility]   Showing future appointments within next 150 days with a meds authorizing provider and meeting all other requirements     7/6/2022

## 2022-07-12 ENCOUNTER — TELEPHONE (OUTPATIENT)
Dept: INTERNAL MEDICINE CLINIC | Age: 52
End: 2022-07-12

## 2022-07-12 ENCOUNTER — E-VISIT (OUTPATIENT)
Dept: PRIMARY CARE CLINIC | Age: 52
End: 2022-07-12
Payer: COMMERCIAL

## 2022-07-12 DIAGNOSIS — R30.0 DYSURIA: Primary | ICD-10-CM

## 2022-07-12 PROCEDURE — 99422 OL DIG E/M SVC 11-20 MIN: CPT | Performed by: NURSE PRACTITIONER

## 2022-07-12 RX ORDER — CEPHALEXIN 500 MG/1
500 CAPSULE ORAL EVERY 6 HOURS
Qty: 40 CAPSULE | Refills: 0 | Status: SHIPPED | OUTPATIENT
Start: 2022-07-12 | End: 2022-07-22

## 2022-08-29 DIAGNOSIS — F41.1 ANXIETY STATE: ICD-10-CM

## 2022-08-29 NOTE — TELEPHONE ENCOUNTER
Recent Visits  Date Type Provider Dept   07/06/22 Office Visit ELMER De - CNP [facility]   05/02/22 Office Visit Sejal Myers MD [facility]   11/01/21 Office Visit Sejal Myers MD [facility]   04/06/21 Office Visit Sejal Myers MD [facility]   Showing recent visits within past 540 days with a meds authorizing provider and meeting all other requirements  Future Appointments  Date Type Provider Dept   09/16/22 Appointment Sejal Myers MD [facility]   11/21/22 Appointment Sejal Myers MD [facility]   Showing future appointments within next 150 days with a meds authorizing provider and meeting all other requirements     7/6/2022

## 2022-08-29 NOTE — TELEPHONE ENCOUNTER
Patient called in regarding the following refill  LORazepam (ATIVAN) 2 MG tablet     Patient has an appointment scheduled for 09/16/2022    Please contact patient when medication has been sent    Phone: [phone]

## 2022-08-30 RX ORDER — LORAZEPAM 2 MG/1
TABLET ORAL
Qty: 60 TABLET | Refills: 5 | Status: SHIPPED | OUTPATIENT
Start: 2022-08-30 | End: 2022-09-16 | Stop reason: SDUPTHER

## 2022-09-05 ENCOUNTER — E-VISIT (OUTPATIENT)
Dept: PRIMARY CARE CLINIC | Age: 52
End: 2022-09-05
Payer: COMMERCIAL

## 2022-09-05 DIAGNOSIS — N76.0 ACUTE VAGINITIS: Primary | ICD-10-CM

## 2022-09-05 PROCEDURE — 99422 OL DIG E/M SVC 11-20 MIN: CPT | Performed by: NURSE PRACTITIONER

## 2022-09-05 RX ORDER — FLUCONAZOLE 150 MG/1
150 TABLET ORAL ONCE
Qty: 1 TABLET | Refills: 1 | Status: SHIPPED | OUTPATIENT
Start: 2022-09-05 | End: 2022-09-05

## 2022-09-05 NOTE — PROGRESS NOTES
Reviewed questionnaire  Reviewed previous encounters, meds, allergies and history     Dx vaginitis     Plan  -rx for diflucan 150 mg po x 1. May repeat in 3 days if symptoms do not improve     I'm sorry to hear that you haven't been feeling well. I will sent in diflucan for what sounds like a yeast infection. If your symptoms worsen or fail to improve please see you PCP or OBGYN. Diflucan can be taken once or may be repeated in 3 days if your symptoms are not fully resolved. I have included information below to help with your symptoms at home. How you can care for yourself at home:     -Wear loose cotton clothing. Do not wear nylon or other fabric that holds body heat and moisture close to the skin. -Try sleeping without underwear.  -Do not scratch. Relieve itching with a cold pack or a cool bath. -Do not wash your vaginal area more than once a day. Use plain water or a mild, unscented soap. Air-dry the vaginal area. -Change out of wet swimsuits after swimming.  -Do not have sex until you have finished your treatment.  -Do not douche.      Call your doctor if:      -You have unexpected vaginal bleeding.   -You have new or increased pain in your vagina or pelvis.   -You have a fever.   -You are not getting better after 3 days.   -Your symptoms come back after you finish your medicines     Time spent 14 min

## 2022-09-16 ENCOUNTER — OFFICE VISIT (OUTPATIENT)
Dept: INTERNAL MEDICINE CLINIC | Age: 52
End: 2022-09-16
Payer: COMMERCIAL

## 2022-09-16 VITALS
HEART RATE: 79 BPM | DIASTOLIC BLOOD PRESSURE: 80 MMHG | OXYGEN SATURATION: 99 % | SYSTOLIC BLOOD PRESSURE: 100 MMHG | BODY MASS INDEX: 23.11 KG/M2 | WEIGHT: 152 LBS

## 2022-09-16 DIAGNOSIS — F41.1 ANXIETY STATE: Primary | ICD-10-CM

## 2022-09-16 DIAGNOSIS — N94.10 DYSPAREUNIA, FEMALE: ICD-10-CM

## 2022-09-16 PROBLEM — R60.0 LOWER EXTREMITY EDEMA: Status: RESOLVED | Noted: 2021-04-08 | Resolved: 2022-09-16

## 2022-09-16 PROCEDURE — 99213 OFFICE O/P EST LOW 20 MIN: CPT | Performed by: INTERNAL MEDICINE

## 2022-09-16 PROCEDURE — G8420 CALC BMI NORM PARAMETERS: HCPCS | Performed by: INTERNAL MEDICINE

## 2022-09-16 PROCEDURE — 4004F PT TOBACCO SCREEN RCVD TLK: CPT | Performed by: INTERNAL MEDICINE

## 2022-09-16 PROCEDURE — G8428 CUR MEDS NOT DOCUMENT: HCPCS | Performed by: INTERNAL MEDICINE

## 2022-09-16 RX ORDER — SULFAMETHOXAZOLE AND TRIMETHOPRIM 800; 160 MG/1; MG/1
TABLET ORAL
Qty: 7 TABLET | Refills: 1 | Status: SHIPPED | OUTPATIENT
Start: 2022-09-16 | End: 2022-10-03

## 2022-09-16 RX ORDER — FLUCONAZOLE 150 MG/1
150 TABLET ORAL ONCE
Qty: 3 TABLET | Refills: 2 | Status: SHIPPED | OUTPATIENT
Start: 2022-09-16 | End: 2022-09-16

## 2022-09-16 RX ORDER — QUETIAPINE FUMARATE 400 MG/1
800 TABLET, FILM COATED ORAL NIGHTLY
Qty: 180 TABLET | Refills: 1 | Status: SHIPPED | OUTPATIENT
Start: 2022-09-16 | End: 2022-10-17 | Stop reason: SDUPTHER

## 2022-09-16 RX ORDER — LORAZEPAM 2 MG/1
TABLET ORAL
Qty: 60 TABLET | Refills: 5 | Status: SHIPPED | OUTPATIENT
Start: 2022-09-16 | End: 2022-10-16 | Stop reason: HOSPADM

## 2022-09-16 NOTE — PROGRESS NOTES
Ant Corcoran (:  1970) is a 50 y.o. female,Established patient, here for evaluation of the following chief complaint(s):  Medication Refill (6 month fu. )      ASSESSMENT/PLAN:  1. Anxiety state  -     LORazepam (ATIVAN) 2 MG tablet; TAKE 1 TO 2 TABLETS BY MOUTH EVERY EVENING BEFORE BED, Disp-60 tablet, R-5Normal  -     QUEtiapine (SEROQUEL) 400 MG tablet; Take 2 tablets by mouth nightly, Disp-180 tablet, R-1Normal  2. Dyspareunia, female  -     fluconazole (DIFLUCAN) 150 MG tablet; Take 1 tablet by mouth once for 1 dose Repeat in three days if no improvement, Disp-3 tablet, R-2Normal    Patient Instructions   Establish with new PCP   Continue with current meds     Return for Establish with new PCP. SUBJECTIVE/OBJECTIVE:  HPI  Ulcerative colitis: she has an abscess that may need surgical evaluation. Stress: Feeling overwhelmed: work stress, needs surgery, getting  next month, home renovations. Has started smoking again due to stress. Previously saw a psychiatrist, but stopped when she transferred here for primary care. Review of Systems  Vitals:    22 0910   BP: 100/80   Site: Right Upper Arm   Position: Sitting   Cuff Size: Medium Adult   Pulse: 79   SpO2: 99%   Weight: 152 lb (68.9 kg)      Wt Readings from Last 3 Encounters:   22 152 lb (68.9 kg)   22 151 lb (68.5 kg)   22 157 lb 3.2 oz (71.3 kg)        Physical Exam  Constitutional:       General: She is not in acute distress. Appearance: She is not ill-appearing. HENT:      Head: Normocephalic and atraumatic. Eyes:      General: No scleral icterus. Right eye: No discharge. Left eye: No discharge. Pulmonary:      Effort: Pulmonary effort is normal. No respiratory distress. Skin:     Coloration: Skin is not jaundiced or pale. Neurological:      General: No focal deficit present. Mental Status: She is alert and oriented to person, place, and time.    Psychiatric:         Mood and Affect: Mood normal.         Behavior: Behavior normal.         Thought Content: Thought content normal.         Judgment: Judgment normal.               An electronic signature was used to authenticate this note. --Adarsh Bean MD     Documentation was done using voice recognition dragon software. Every effort was made to ensure accuracy; however, inadvertent, unintentional computerized transcription errors may be present.

## 2022-09-19 ENCOUNTER — TELEPHONE (OUTPATIENT)
Dept: INTERNAL MEDICINE CLINIC | Age: 52
End: 2022-09-19

## 2022-09-19 NOTE — TELEPHONE ENCOUNTER
----- Message from [employer] sent at 9/19/2022  3:14 PM EDT -----  Subject: Message to Provider    QUESTIONS  Information for Provider? Pt will be in [place] when she will run out of   her lorazepam. Can you giver her 5 extra. She is leaving oct 23  ---------------------------------------------------------------------------  --------------  Nirali GREEN  [phone]; OK to leave message on voicemail  ---------------------------------------------------------------------------  --------------  SCRIPT ANSWERS  Relationship to Patient?  Self

## 2022-10-03 ENCOUNTER — OFFICE VISIT (OUTPATIENT)
Dept: SURGERY | Age: 52
End: 2022-10-03
Payer: COMMERCIAL

## 2022-10-03 VITALS
WEIGHT: 148 LBS | OXYGEN SATURATION: 96 % | BODY MASS INDEX: 23.23 KG/M2 | HEIGHT: 67 IN | SYSTOLIC BLOOD PRESSURE: 110 MMHG | TEMPERATURE: 97.5 F | DIASTOLIC BLOOD PRESSURE: 76 MMHG | RESPIRATION RATE: 15 BRPM | HEART RATE: 93 BPM

## 2022-10-03 DIAGNOSIS — K51.919 ULCERATIVE COLITIS WITH COMPLICATION, UNSPECIFIED LOCATION (HCC): Primary | ICD-10-CM

## 2022-10-03 PROCEDURE — 99204 OFFICE O/P NEW MOD 45 MIN: CPT | Performed by: SURGERY

## 2022-10-03 PROCEDURE — 3017F COLORECTAL CA SCREEN DOC REV: CPT | Performed by: SURGERY

## 2022-10-03 PROCEDURE — G8484 FLU IMMUNIZE NO ADMIN: HCPCS | Performed by: SURGERY

## 2022-10-03 PROCEDURE — G8420 CALC BMI NORM PARAMETERS: HCPCS | Performed by: SURGERY

## 2022-10-03 PROCEDURE — 4004F PT TOBACCO SCREEN RCVD TLK: CPT | Performed by: SURGERY

## 2022-10-03 PROCEDURE — G8427 DOCREV CUR MEDS BY ELIG CLIN: HCPCS | Performed by: SURGERY

## 2022-10-03 NOTE — PROGRESS NOTES
[facility]    CC: Perineal wound    Consulting physician: Simi Wylie MD    HPI:52 y. o.female with a PMHx as delineated below who presents in consultation for a chronically draining pelvic collection. She was found to have ulcerative colitis and underwent a total abdominal colectomy with end ileostomy in 2006 by Dr. Thompson Cunningham. She developed recurrent episodes of infection from the rectal stump and underwent a completion proctocolectomy in 2008. She has had issues since that time and has had fairly persistent drainage. She underwent drainage and attempted primary repair with Dr. Tigist Braden in 2012 and has noted to have persistent collection and drainage. Given these symptoms, she was referred for evaluation and potential treatment. She also notes that she has drainage that comes out from her vagina with pain.   History of the wound and wound care is as follows:    Ambulatory: Yes  Likely etiology: multifactorial  Incontinent: No (ileostomy without issue)   Age of onset: 29  Prior interventions: surgical interventions  Flaps in the past: None  Nutrition: tolerates PO    PMHx:   Past Medical History:   Diagnosis Date    Acute superficial venous thrombosis of left lower extremity     post operative, left thigh    Depression     Ileostomy in place ([facility] [address]     Ulcerative colitis ([facility] [address]     Vitamin B12 deficiency      PSHx:   Past Surgical History:   Procedure Laterality Date    BREAST ENHANCEMENT SURGERY  2003    COLECTOMY  2006    COSMETIC SURGERY      medial thigh lift    RECTAL SURGERY  2008    Transanal rectal resection    DOLLY-EN-Y GASTRIC BYPASS  2001    350 lb pre surgery   RYGB (2001)  Transanal excision (2008)  Medial thigh lifts (2006)    ALLERGIES:   Allergies   Allergen Reactions    Hylan G-F 20 Swelling    Morphine Nausea Only     DOES NOT RELIEVE PAIN     FHx: Reviewed and is noncontributory    Meds:   Current Outpatient Medications   Medication Sig Dispense Refill sulfamethoxazole-trimethoprim (BACTRIM DS;SEPTRA DS) 800-160 MG per tablet TAKE 1 TABLET BY MOUTH TWICE A DAY FOR 3 DAYS 7 tablet 1    LORazepam (ATIVAN) 2 MG tablet TAKE 1 TO 2 TABLETS BY MOUTH EVERY EVENING BEFORE BED 60 tablet 5    QUEtiapine (SEROQUEL) 400 MG tablet Take 2 tablets by mouth nightly 180 tablet 1    furosemide (LASIX) 40 MG tablet TAKE 1 TABLET BY MOUTH DAILY AS NEEDED FOR WATER RETENTION 90 tablet 5    hydroquinone 4 % cream APPLY CREAM TOPICALLY TO THE AFFECTED AREA TWICE DAILY. nystatin (MYCOSTATIN) 971827 UNIT/GM powder APPLY TOPICALLY 2 TIMES A DAY. 3     No current facility-administered medications for this visit. SocHx: Smoking: YES (E-cig)    ETOH: occasional    Drug use: + marijuana      ROS   Constitutional: Negative for chills and fever. HENT: Negative for congestion, facial swelling, and voice change. Eyes: Negative for photophobia and visual disturbance. Respiratory: Negative for apnea, cough, chest tightness and shortness of breath. Cardiovascular: Negative for chest pain and palpitations. Gastrointestinal: See HPI  Genitourinary: Negative for difficulty urinating, dysuria, flank pain, frequency and hematuria. Musculoskeletal: Negative for new gait problem, joint swelling and myalgias. Skin: See HPI  Endocrine: negative for tremors, temperature intolerance or polydipsia. Allergic/Immunologic: Negative for new environmental or food allergies. Neurological: Negative for dizziness, seizures, speech difficulty, numbness. Hematological: Negative for adenopathy. Psychiatric/Behavioral: Negative for agitation and confusion.     EXAM   /76   Pulse 93   Temp 97.5 °F (36.4 °C) (Temporal)   Resp 15   Ht 5' 7\" (1.702 m)   Wt 148 lb (67.1 kg)   SpO2 96%   BMI 23.18 kg/m²     GEN: NAD, pleasant, healthy  CVS: RRR  PULM: No respiratory distress  HEENT: PERRLA/EOMI; hearing appears within normal limits  NECK: Supple with trachea in midline, no masses  EXT: Bilateral inner thigh scars from thigh lift  PERINEUM: Track noted extending from below the vagina - no obvious external communication or drainage  Some dermatitis noted potentially consistent with yeast  ABD: soft/NT/ND  NEURO: No focal deficits, no obvious CN deficits    IMAGING: CT imaging reviewed     MICRO: Wound culture: PENDING    PATHOLOGY:     LABS    Nutrition: Prealbumin NA; Transferrin NA; Albumin NA    IMP: 50 y. o.female with a chronic, nonhealing pelvic collection in the setting of APR from [facility]  PLAN: Complex situation given comorbid conditions. She is to have an IR aspiration of her collection with culture to then dictate the potential timing for surgical intervention. May require antibiotics prior to surgery. Will then perform excision with Methylene Blue followed by possible placement of instillation vac with Vashe irrigation for 72 hours. At the same admission, will then perform gracilis flap(s) for obliteration of the deadspace. Prior to any intervention, she will also completely abstain from all nicotine products. Will work with Dr. Yenny Pugh and determine the scheduling. The pathology and recurrent nature of pelvic wounds were discussed with the patient. She was counseled on medical optimization (nutrition, pressure offloading, dressing changes, etc.) as well as surgical woundoptimization with debridement(s). Clinical photos were obtained. Additional conversation regarding flap reconstructive options as well as postoperative care regimen was carried out with the patient. \"A significant amount of time was also allocated to nicotine's effect on wound healing and the patient understands that a sub-optimal wound healing and cosmetic result may occur with continued utilization. The risks, benefits, alternatives, outcomes, personnel involved were discussed and all questions were answered in a satisfactory manner according to the patient.  Specifically, the risks including, but not limited to:bleeding possibly requiring transfusion or reoperation, infection, seroma, nonhealing, recurrence necessitating reoperation, poor cosmetic outcome, scarring,  flap failure, VTE(DVT/PE), and death were discussed.      Juan Carlos Rodrigues MD  [facility]  10/03/22

## 2022-10-10 ENCOUNTER — TELEPHONE (OUTPATIENT)
Dept: SURGERY | Age: 52
End: 2022-10-10

## 2022-10-10 DIAGNOSIS — Z72.0 NICOTINE ABUSE: Primary | ICD-10-CM

## 2022-10-10 NOTE — TELEPHONE ENCOUNTER
Returned patient call, patient had CT guided aspiration at [facility] on 10/5/22:   Technically successful CT-guided aspiration of a presacral low density collection. Only scant fluid could be aspirated, as discussed above. This fluid was sent for aerobic and anaerobic culture. With below:   Few Polymorphonuclear Leukocytes Seen . No Squamous Epithelial Cells Seen . No Organisms Seen . Patient wants to know next steps:   As note from 10/3/22:   IMP: 50 y. o.female with a chronic, nonhealing pelvic collection in the setting of APR from [facility]  PLAN: Complex situation given comorbid conditions. She is to have an IR aspiration of her collection with culture to then dictate the potential timing for surgical intervention. May require antibiotics prior to surgery. Will then perform excision with Methylene Blue followed by possible placement of instillation vac with Vashe irrigation for 72 hours. At the same admission, will then perform gracilis flap(s) for obliteration of the deadspace. Prior to any intervention, she will also completely abstain from all nicotine products. Will work with Dr. Todd Garcia and determine the scheduling. Routing to MD to determine if negative nicotine is necessary to start scheduling.

## 2022-10-12 NOTE — TELEPHONE ENCOUNTER
Called patient and LM that a negative nicotine will be needed as discussed. Lab is in. Patient may have this drawn at any [address] [place] [address] lab once nicotine free for 30 days. Patient to call office with any additional questions.

## 2022-10-12 NOTE — TELEPHONE ENCOUNTER
Returned patient call. Patient had questions about if she would have a wound vac prior to surgery. No wound vac prior to surgery, a wound vac will be placed at  time of debridement. 1st surgery will be a debridement with Dr Coleen Diaz with Vac. Second surgery will be to clear the space and do a flap at that time. Patient spoke with MD at length with additional surgical questions. If 30 days with a negative nicotine, we can work to schedule a a co case with Dr Coleen Diaz.

## 2022-10-12 NOTE — TELEPHONE ENCOUNTER
[facility]    Good news overall. Will need to get nicotine level. Once negative, good to schedule. Thanks!   NK

## 2022-10-12 NOTE — TELEPHONE ENCOUNTER
Patient called asking if the negative culture will change the surgical plan. She would like to know if she will still need the wound vac.     Please call: [phone]

## 2022-10-14 DIAGNOSIS — F41.1 ANXIETY STATE: ICD-10-CM

## 2022-10-14 NOTE — TELEPHONE ENCOUNTER
----- Message from Akash Sequeira sent at 10/14/2022  8:36 AM EDT -----  Subject: Refill Request    QUESTIONS  Name of Medication? QUEtiapine (SEROQUEL) 400 MG tablet  Patient-reported dosage and instructions? 400 MG Take 2 tablets by mouth   every night  How many days do you have left? 2  Preferred Pharmacy? [id number]  Pharmacy phone number (if available)? [phone]  Additional Information for Provider? Patient stated that the pharmacy did   not have her refills. ---------------------------------------------------------------------------  --------------,  Name of Medication? LORazepam (ATIVAN) 2 MG tablet  Patient-reported dosage and instructions? 2 MG Take 2 at night as needed   30 day supply  How many days do you have left? 16  Preferred Pharmacy? [id number]  Pharmacy phone number (if available)? [phone]  ---------------------------------------------------------------------------  --------------  Roni GREEN  What is the best way for the office to contact you? OK to leave message on   [employer], OK to respond with electronic message via [employer] portal (only   for patients who have registered [employer] account)  Preferred Call Back Phone Number? [phone]  ---------------------------------------------------------------------------  --------------  SCRIPT ANSWERS  Relationship to Patient?  Self

## 2022-10-17 RX ORDER — QUETIAPINE FUMARATE 400 MG/1
800 TABLET, FILM COATED ORAL NIGHTLY
Qty: 60 TABLET | Refills: 0 | Status: SHIPPED | OUTPATIENT
Start: 2022-10-17 | End: 2022-11-16

## 2023-01-05 ENCOUNTER — E-VISIT (OUTPATIENT)
Dept: PRIMARY CARE CLINIC | Age: 53
End: 2023-01-05
Payer: COMMERCIAL

## 2023-01-05 DIAGNOSIS — M54.50 ACUTE LOW BACK PAIN, UNSPECIFIED BACK PAIN LATERALITY, UNSPECIFIED WHETHER SCIATICA PRESENT: Primary | ICD-10-CM

## 2023-01-05 PROCEDURE — 99422 OL DIG E/M SVC 11-20 MIN: CPT | Performed by: NURSE PRACTITIONER

## 2023-01-05 RX ORDER — METHOCARBAMOL 500 MG/1
500 TABLET, FILM COATED ORAL 4 TIMES DAILY
Qty: 40 TABLET | Refills: 0 | Status: SHIPPED | OUTPATIENT
Start: 2023-01-05 | End: 2023-01-15

## 2023-01-05 RX ORDER — METHYLPREDNISOLONE 4 MG/1
TABLET ORAL
Qty: 1 KIT | Refills: 0 | Status: SHIPPED | OUTPATIENT
Start: 2023-01-05 | End: 2023-01-11

## 2023-01-05 NOTE — PROGRESS NOTES
Dio Gonzalez (1970) initiated an asynchronous digital communication through [address] [place] [address]. HPI: per patient questionnaire     Exam: not applicable    Diagnoses and all orders for this visit:  Diagnoses and all orders for this visit:    Acute low back pain, unspecified back pain laterality, unspecified whether sciatica present    Other orders  -     methylPREDNISolone (MEDROL, JG,) 4 MG tablet; Take as directed  -     methocarbamol (ROBAXIN) 500 MG tablet; Take 1 tablet by mouth 4 times daily for 10 days    Supportive care measures provided  Recommend follow-up with PCP if symptoms do not improve    Time: EV2 - 11-20 minutes were spent on the digital evaluation and management of this patient.  15 min     Gerhardt Plowman, APRN - CNP

## 2024-03-09 NOTE — TELEPHONE ENCOUNTER
Called pt and advised. She was unable to get through the evisit prompts. Office personal was unable to help pt was a little aggravated she asked if she could do a doxy or my chart. SG
If the dermatologist checked and found a UTI, the patient should contact dermatology. We could also do a Evisit. Please send patient a message.
Patient called in regarding the dermatlogist  -patient went for shingles  -patient was tested for UTI  -results came back positive for UTI    Patient called to get medication for UTI    Patient is leaving to go out of town 07/13/2022    Phone: [phone]
Patient called to follow up on medication
Please advise. Thanks!  SG
Other

## 2025-07-23 ENCOUNTER — ANESTHESIA EVENT (OUTPATIENT)
Dept: ENDOSCOPY | Age: 55
End: 2025-07-23
Payer: COMMERCIAL

## 2025-07-23 ENCOUNTER — APPOINTMENT (OUTPATIENT)
Dept: ENDOSCOPY | Age: 55
End: 2025-07-23
Attending: INTERNAL MEDICINE
Payer: COMMERCIAL

## 2025-07-23 ENCOUNTER — ANESTHESIA (OUTPATIENT)
Dept: ENDOSCOPY | Age: 55
End: 2025-07-23
Payer: COMMERCIAL

## 2025-07-23 ENCOUNTER — HOSPITAL ENCOUNTER (OUTPATIENT)
Age: 55
Setting detail: OUTPATIENT SURGERY
Discharge: HOME | End: 2025-07-23
Attending: INTERNAL MEDICINE | Admitting: INTERNAL MEDICINE
Payer: COMMERCIAL

## 2025-07-23 VITALS
BODY MASS INDEX: 24.11 KG/M2 | RESPIRATION RATE: 18 BRPM | WEIGHT: 150 LBS | TEMPERATURE: 98.3 F | HEIGHT: 66 IN | SYSTOLIC BLOOD PRESSURE: 126 MMHG | DIASTOLIC BLOOD PRESSURE: 82 MMHG | OXYGEN SATURATION: 99 % | HEART RATE: 67 BPM

## 2025-07-23 PROCEDURE — 7100000011 HC PHASE II RECOVERY - ADDTL 15 MIN: Performed by: INTERNAL MEDICINE

## 2025-07-23 PROCEDURE — 7100000010 HC PHASE II RECOVERY - FIRST 15 MIN: Performed by: INTERNAL MEDICINE

## 2025-07-23 PROCEDURE — 3609027000 HC COLONOSCOPY: Performed by: INTERNAL MEDICINE

## 2025-07-23 PROCEDURE — 3700000001 HC ADD 15 MINUTES (ANESTHESIA): Performed by: INTERNAL MEDICINE

## 2025-07-23 PROCEDURE — 2580000003 HC RX 258: Performed by: ANESTHESIOLOGY

## 2025-07-23 PROCEDURE — 6360000002 HC RX W HCPCS: Performed by: NURSE ANESTHETIST, CERTIFIED REGISTERED

## 2025-07-23 PROCEDURE — 3700000000 HC ANESTHESIA ATTENDED CARE: Performed by: INTERNAL MEDICINE

## 2025-07-23 RX ORDER — PROPOFOL 10 MG/ML
INJECTION, EMULSION INTRAVENOUS
Status: DISCONTINUED | OUTPATIENT
Start: 2025-07-23 | End: 2025-07-23 | Stop reason: SDUPTHER

## 2025-07-23 RX ORDER — SODIUM CHLORIDE 0.9 % (FLUSH) 0.9 %
5-40 SYRINGE (ML) INJECTION EVERY 12 HOURS SCHEDULED
Status: DISCONTINUED | OUTPATIENT
Start: 2025-07-23 | End: 2025-07-23 | Stop reason: HOSPADM

## 2025-07-23 RX ORDER — SULFASALAZINE 500 MG/1
500 TABLET ORAL 2 TIMES DAILY
COMMUNITY
Start: 2025-06-16

## 2025-07-23 RX ORDER — CYANOCOBALAMIN 1000 UG/ML
INJECTION, SOLUTION INTRAMUSCULAR; SUBCUTANEOUS
COMMUNITY

## 2025-07-23 RX ORDER — ADALIMUMAB-ADBM 40MG/0.4ML
KIT SUBCUTANEOUS
COMMUNITY
Start: 2025-05-06

## 2025-07-23 RX ORDER — LIDOCAINE HYDROCHLORIDE 20 MG/ML
INJECTION, SOLUTION EPIDURAL; INFILTRATION; INTRACAUDAL; PERINEURAL
Status: DISCONTINUED | OUTPATIENT
Start: 2025-07-23 | End: 2025-07-23 | Stop reason: SDUPTHER

## 2025-07-23 RX ORDER — SODIUM CHLORIDE 0.9 % (FLUSH) 0.9 %
5-40 SYRINGE (ML) INJECTION PRN
Status: DISCONTINUED | OUTPATIENT
Start: 2025-07-23 | End: 2025-07-23 | Stop reason: HOSPADM

## 2025-07-23 RX ORDER — LIDOCAINE HYDROCHLORIDE 10 MG/ML
1 INJECTION, SOLUTION EPIDURAL; INFILTRATION; INTRACAUDAL; PERINEURAL
Status: DISCONTINUED | OUTPATIENT
Start: 2025-07-23 | End: 2025-07-23 | Stop reason: HOSPADM

## 2025-07-23 RX ORDER — SODIUM CHLORIDE, SODIUM LACTATE, POTASSIUM CHLORIDE, CALCIUM CHLORIDE 600; 310; 30; 20 MG/100ML; MG/100ML; MG/100ML; MG/100ML
INJECTION, SOLUTION INTRAVENOUS CONTINUOUS
Status: DISCONTINUED | OUTPATIENT
Start: 2025-07-23 | End: 2025-07-23 | Stop reason: HOSPADM

## 2025-07-23 RX ORDER — LORAZEPAM 2 MG/1
4 TABLET ORAL NIGHTLY
COMMUNITY

## 2025-07-23 RX ADMIN — SODIUM CHLORIDE, SODIUM LACTATE, POTASSIUM CHLORIDE, AND CALCIUM CHLORIDE: .6; .31; .03; .02 INJECTION, SOLUTION INTRAVENOUS at 08:43

## 2025-07-23 RX ADMIN — PROPOFOL 50 MG: 10 INJECTION, EMULSION INTRAVENOUS at 09:32

## 2025-07-23 RX ADMIN — PROPOFOL 100 MG: 10 INJECTION, EMULSION INTRAVENOUS at 09:26

## 2025-07-23 RX ADMIN — PROPOFOL 120 MCG/KG/MIN: 10 INJECTION, EMULSION INTRAVENOUS at 09:26

## 2025-07-23 RX ADMIN — LIDOCAINE HYDROCHLORIDE 100 MG: 20 INJECTION, SOLUTION EPIDURAL; INFILTRATION; INTRACAUDAL; PERINEURAL at 09:26

## 2025-07-23 ASSESSMENT — PAIN SCALES - GENERAL
PAINLEVEL_OUTOF10: 0

## 2025-07-23 ASSESSMENT — PAIN - FUNCTIONAL ASSESSMENT: PAIN_FUNCTIONAL_ASSESSMENT: 0-10

## 2025-07-23 NOTE — ANESTHESIA PRE PROCEDURE
Department of Anesthesiology  Preprocedure Note       Name:  Karin Tyson   Age:  54 y.o.  :  1970                                          MRN:  [id number]         Date:  2025      Surgeon: Surgeon(s):  Aaron Garcia MD    Procedure: Procedure(s):  ILEOSCOPY    Medications prior to admission:   Prior to Admission medications    Medication Sig Start Date End Date Taking? Authorizing Provider   QUEtiapine (SEROQUEL) 400 MG tablet Take 2 tablets by mouth nightly 10/17/22 11/16/22  Brendon Flowers MD   furosemide (LASIX) 40 MG tablet TAKE 1 TABLET BY MOUTH DAILY AS NEEDED FOR WATER RETENTION 22   Jazmin Sidhu MD   hydroquinone 4 % cream APPLY CREAM TOPICALLY TO THE AFFECTED AREA TWICE DAILY. 20   Amanda Calvin MD   nystatin (MYCOSTATIN) 608904 UNIT/GM powder APPLY TOPICALLY 2 TIMES A DAY. 18   Amanda Calvin MD       Current medications:    No current facility-administered medications for this encounter.       Allergies:    Allergies   Allergen Reactions   • Hylan G-F 20 Swelling   • Morphine Nausea Only     DOES NOT RELIEVE PAIN       Problem List:    Patient Active Problem List   Diagnosis Code   • Anxiety state F41.1   • Ileostomy in place ([facility]) Z93.2   • Intestinal malabsorption K90.9   • Ulcerative colitis ([facility]) K51.90       Past Medical History:        Diagnosis Date   • Acute superficial venous thrombosis of left lower extremity     post operative, left thigh   • Depression    • Ileostomy in place ([facility])    • Vitamin B12 deficiency        Past Surgical History:        Procedure Laterality Date   • BREAST ENHANCEMENT SURGERY     • COLECTOMY     • COSMETIC SURGERY      medial thigh lift   • RECTAL SURGERY      Transanal rectal resection   • DOLLY-EN-Y GASTRIC BYPASS      350 lb pre surgery       Social History:    Social History     Tobacco Use   • Smoking status: Light Smoker     Types: E-Cigarettes     Last attempt to quit:

## 2025-07-23 NOTE — DISCHARGE INSTRUCTIONS
ENDOSCOPY DISCHARGE INSTRUCTIONS:    Call the physician that did your procedure for any questions or concern:    [facility]: [phone]  DR. WILLIAMS JULIAN      ACTIVITY:    There are potential side effects to the medications used for sedation and anesthesia during your procedure.  These include:  Dizziness or light-headedness, confusion or memory loss, delayed reaction times, loss of coordination, nausea and vomiting.  Because of your increased risk for injury, we ask that you observe the following precautions:  For the next 24 hours,  DO NOT operate an automobile, bicycle, motorcycle, , power tools or large equipment of any kind.  Do not drink alcohol, sign any legal documents or make any legal decisions for 24 hours.  Do not bend your head over lower than your heart.  DO sit on the side of bed/couch awhile before getting up.  Plan on bedrest or quiet relaxation today.  You may resume normal activities in 24 hours.    DIET:    Your first meal today should be light, avoiding spicy and fatty foods.  If you tolerate this first meal, then you may advance to your regular diet unless otherwise advised by your physician.    NORMAL SYMPTOMS:  -Mild sore throat if you’ve had an EGD   -Gaseous discomfort    NOTIFY YOUR PHYSICIAN IF THESE SYMPTOMS OCCUR:  1. Fever (greater than 100)  5. Increased abdominal bloating  2. Severe pain    6. Excessive bleeding  3. Nausea and vomiting  7. Chest pain                                                                    4. Chills    8. Shortness of breath    ADDITIONAL INSTRUCTIONS:    Recommendations:    - Continue current adalimumab given significant improvement in inflammation seen.   - Repeat ileoscopy in 2 years for surveillance purposes if ongoing clinical and biochemical remission     -Results from today's procedure will post to the [facility] portal automatically when available.  A subsequent message with physician interpretation will follow.  Please contact  [facility] office if results are not received within 2 weeks of this procedure.     Biopsy results: Call [facility] for biopsy results in 1 week    Educational Information:          Please review these discharge instructions this evening or tomorrow for  information you may have forgotten.            We want to thank you for choosing the [facility] as your health care provider. We always strive to provide you with excellent care while you are here. You may receive a survey in the mail regarding your care. We would appreciate you taking a few minutes of your time to complete this survey.

## 2025-07-23 NOTE — OP NOTE
Ileoscopy via stoma procedure  Note          Patient: Karin Tyson  : 1970  CSN: [id number]    Procedure: Ileoscopy via stoma     Date:  2025    Surgeon:  Aaron Garcia MD    Referring Physician:   Fidel Mccloud MD    Preoperative Diagnosis: History of Crohn's disease    Postoperative Diagnosis: Per impressions below    Anesthesia:  MAC per anesthesia record.      EBL: <5 mL    Indications: This is a 54 y.o. year old female who presents today with history of fulminant ulcerative colitis diagnosed in .  In  developed onset of peristomal wound concerning for fistula and ileoscopy demonstrating Crohn's ileitis.  Evaluation of current disease activity on adalimumab.      Procedure:   An informed consent was obtained from the patient after explanation of indications, benefits, possible risks and complications of the procedure.  The patient was then taken to the endoscopy suite, placed in the left lateral decubitus position, and the above IV anesthesia was administered.    After digital examination of the ileostomy, the Olympus pediatric colonoscope was inserted through stoma the scope was then carefully advanced to 25 cm proximal to the skin surface. The quality of preparation was good.  The scope was carefully withdrawn for more than 6 minutes.  Scope was then straightened, the ileum was decompressed, and the scope was removed.    Findings:   - External ileostomy: Normal   -Ileum: The distal 10 cm of the ileum there was subtle nodularity consistent with prior ulceration and subsequent scarring.  2 tiny pseudo diverticula seen in the distal 5 cm of the ileum which may represent prior fistulization sites.  No stenosis was seen.  There was no evidence of active Crohn's disease with no erythema, erosions, or ulcerations noted.      The patient tolerated the procedure well and was taken to the PACU in good condition.    Complications: No immediate complications.     Impression:    -

## 2025-07-23 NOTE — ANESTHESIA POSTPROCEDURE EVALUATION
Department of Anesthesiology  Postprocedure Note    Patient: Karin Tyson  MRN: [id number]  YOB: 1970  Date of evaluation: 7/23/2025    Procedure Summary       Date: 07/23/25 Room / Location: [facility] ENDO  / [facility]    Anesthesia Start: 0918 Anesthesia Stop: 0944    Procedure: ILEOSCOPY Diagnosis:       Crohn's disease of small intestine with fistula (HCC)      (Crohn's disease of small intestine with fistula (HCC) [K50.013])    Surgeons: Aaron Garcia MD Responsible Provider: Osvaldo Bobby MD    Anesthesia Type: MAC ASA Status: 3            Anesthesia Type: No value filed.    Silva Phase I: Silva Score: 10    Silva Phase II: Silva Score: 10    Anesthesia Post Evaluation    Patient location during evaluation: PACU  Patient participation: complete - patient participated  Level of consciousness: awake  Pain score: 0  Airway patency: patent  Nausea & Vomiting: no nausea  Cardiovascular status: hemodynamically stable  Respiratory status: acceptable  Hydration status: stable  Pain management: adequate    No notable events documented.